# Patient Record
Sex: MALE | Race: WHITE | NOT HISPANIC OR LATINO | Employment: OTHER | URBAN - METROPOLITAN AREA
[De-identification: names, ages, dates, MRNs, and addresses within clinical notes are randomized per-mention and may not be internally consistent; named-entity substitution may affect disease eponyms.]

---

## 2023-06-02 ENCOUNTER — APPOINTMENT (OUTPATIENT)
Dept: GENERAL RADIOLOGY | Facility: CLINIC | Age: 73
DRG: 190 | End: 2023-06-02
Attending: EMERGENCY MEDICINE
Payer: COMMERCIAL

## 2023-06-02 ENCOUNTER — HOSPITAL ENCOUNTER (INPATIENT)
Facility: CLINIC | Age: 73
LOS: 5 days | Discharge: HOME | DRG: 190 | End: 2023-06-07
Attending: EMERGENCY MEDICINE | Admitting: INTERNAL MEDICINE
Payer: COMMERCIAL

## 2023-06-02 DIAGNOSIS — J44.9 CHRONIC OBSTRUCTIVE PULMONARY DISEASE, UNSPECIFIED COPD TYPE (H): Primary | ICD-10-CM

## 2023-06-02 DIAGNOSIS — J96.00 ACUTE RESPIRATORY FAILURE, UNSPECIFIED WHETHER WITH HYPOXIA OR HYPERCAPNIA (H): ICD-10-CM

## 2023-06-02 DIAGNOSIS — J44.1 COPD EXACERBATION (H): ICD-10-CM

## 2023-06-02 LAB
ALBUMIN SERPL BCG-MCNC: 4.6 G/DL (ref 3.5–5.2)
ALBUMIN UR-MCNC: NEGATIVE MG/DL
ALP SERPL-CCNC: 60 U/L (ref 40–129)
ALT SERPL W P-5'-P-CCNC: 15 U/L (ref 10–50)
ANION GAP SERPL CALCULATED.3IONS-SCNC: 13 MMOL/L (ref 7–15)
APPEARANCE UR: CLEAR
AST SERPL W P-5'-P-CCNC: 25 U/L (ref 10–50)
ATRIAL RATE - MUSE: 130 BPM
BASOPHILS # BLD AUTO: 0.1 10E3/UL (ref 0–0.2)
BASOPHILS NFR BLD AUTO: 1 %
BILIRUB SERPL-MCNC: 0.6 MG/DL
BILIRUB UR QL STRIP: NEGATIVE
BUN SERPL-MCNC: 6.6 MG/DL (ref 8–23)
CALCIUM SERPL-MCNC: 9.2 MG/DL (ref 8.8–10.2)
CHLORIDE SERPL-SCNC: 102 MMOL/L (ref 98–107)
COLOR UR AUTO: ABNORMAL
CREAT SERPL-MCNC: 0.59 MG/DL (ref 0.67–1.17)
CRP SERPL-MCNC: 12.21 MG/L
D DIMER PPP FEU-MCNC: 0.4 UG/ML FEU (ref 0–0.5)
DEPRECATED HCO3 PLAS-SCNC: 27 MMOL/L (ref 22–29)
DIASTOLIC BLOOD PRESSURE - MUSE: NORMAL MMHG
EOSINOPHIL # BLD AUTO: 0.8 10E3/UL (ref 0–0.7)
EOSINOPHIL NFR BLD AUTO: 9 %
ERYTHROCYTE [DISTWIDTH] IN BLOOD BY AUTOMATED COUNT: 13.2 % (ref 10–15)
FLUAV RNA SPEC QL NAA+PROBE: NEGATIVE
FLUBV RNA RESP QL NAA+PROBE: NEGATIVE
GFR SERPL CREATININE-BSD FRML MDRD: >90 ML/MIN/1.73M2
GLUCOSE BLDC GLUCOMTR-MCNC: 136 MG/DL (ref 70–99)
GLUCOSE BLDC GLUCOMTR-MCNC: 146 MG/DL (ref 70–99)
GLUCOSE BLDC GLUCOMTR-MCNC: 159 MG/DL (ref 70–99)
GLUCOSE SERPL-MCNC: 140 MG/DL (ref 70–99)
GLUCOSE UR STRIP-MCNC: NEGATIVE MG/DL
HCO3 BLDV-SCNC: 29 MMOL/L (ref 21–28)
HCT VFR BLD AUTO: 47.2 % (ref 40–53)
HGB BLD-MCNC: 15.5 G/DL (ref 13.3–17.7)
HGB UR QL STRIP: NEGATIVE
HOLD SPECIMEN: NORMAL
HOLD SPECIMEN: NORMAL
IMM GRANULOCYTES # BLD: 0 10E3/UL
IMM GRANULOCYTES NFR BLD: 0 %
INTERPRETATION ECG - MUSE: NORMAL
KETONES UR STRIP-MCNC: 40 MG/DL
LACTATE BLD-SCNC: 1.2 MMOL/L
LACTATE SERPL-SCNC: 1.3 MMOL/L (ref 0.7–2)
LEUKOCYTE ESTERASE UR QL STRIP: NEGATIVE
LYMPHOCYTES # BLD AUTO: 0.9 10E3/UL (ref 0.8–5.3)
LYMPHOCYTES NFR BLD AUTO: 11 %
MCH RBC QN AUTO: 28.5 PG (ref 26.5–33)
MCHC RBC AUTO-ENTMCNC: 32.8 G/DL (ref 31.5–36.5)
MCV RBC AUTO: 87 FL (ref 78–100)
MONOCYTES # BLD AUTO: 0.5 10E3/UL (ref 0–1.3)
MONOCYTES NFR BLD AUTO: 6 %
NEUTROPHILS # BLD AUTO: 6 10E3/UL (ref 1.6–8.3)
NEUTROPHILS NFR BLD AUTO: 73 %
NITRATE UR QL: NEGATIVE
NRBC # BLD AUTO: 0 10E3/UL
NRBC BLD AUTO-RTO: 0 /100
NT-PROBNP SERPL-MCNC: 241 PG/ML (ref 0–900)
P AXIS - MUSE: 75 DEGREES
PCO2 BLDV: 72 MM HG (ref 40–50)
PH BLDV: 7.21 [PH] (ref 7.32–7.43)
PH UR STRIP: 7.5 [PH] (ref 5–7)
PLATELET # BLD AUTO: 187 10E3/UL (ref 150–450)
PO2 BLDV: 73 MM HG (ref 25–47)
POTASSIUM SERPL-SCNC: 3.9 MMOL/L (ref 3.4–5.3)
PR INTERVAL - MUSE: 148 MS
PROCALCITONIN SERPL IA-MCNC: 0.08 NG/ML
PROT SERPL-MCNC: 7.5 G/DL (ref 6.4–8.3)
QRS DURATION - MUSE: 96 MS
QT - MUSE: 300 MS
QTC - MUSE: 441 MS
R AXIS - MUSE: -40 DEGREES
RBC # BLD AUTO: 5.44 10E6/UL (ref 4.4–5.9)
RSV RNA SPEC NAA+PROBE: NEGATIVE
SAO2 % BLDV: 90 % (ref 94–100)
SARS-COV-2 RNA RESP QL NAA+PROBE: NEGATIVE
SODIUM SERPL-SCNC: 142 MMOL/L (ref 136–145)
SP GR UR STRIP: 1.01 (ref 1–1.03)
SYSTOLIC BLOOD PRESSURE - MUSE: NORMAL MMHG
T AXIS - MUSE: 60 DEGREES
TROPONIN T SERPL HS-MCNC: 19 NG/L
TROPONIN T SERPL HS-MCNC: 95 NG/L
UROBILINOGEN UR STRIP-MCNC: NORMAL MG/DL
VENTRICULAR RATE- MUSE: 130 BPM
WBC # BLD AUTO: 8.3 10E3/UL (ref 4–11)

## 2023-06-02 PROCEDURE — 250N000009 HC RX 250: Performed by: INTERNAL MEDICINE

## 2023-06-02 PROCEDURE — 120N000013 HC R&B IMCU

## 2023-06-02 PROCEDURE — 999N000157 HC STATISTIC RCP TIME EA 10 MIN

## 2023-06-02 PROCEDURE — 85379 FIBRIN DEGRADATION QUANT: CPT | Performed by: EMERGENCY MEDICINE

## 2023-06-02 PROCEDURE — 82962 GLUCOSE BLOOD TEST: CPT

## 2023-06-02 PROCEDURE — 250N000009 HC RX 250

## 2023-06-02 PROCEDURE — 99223 1ST HOSP IP/OBS HIGH 75: CPT | Performed by: INTERNAL MEDICINE

## 2023-06-02 PROCEDURE — 80053 COMPREHEN METABOLIC PANEL: CPT | Performed by: EMERGENCY MEDICINE

## 2023-06-02 PROCEDURE — 81003 URINALYSIS AUTO W/O SCOPE: CPT | Performed by: INTERNAL MEDICINE

## 2023-06-02 PROCEDURE — 99291 CRITICAL CARE FIRST HOUR: CPT | Mod: 25

## 2023-06-02 PROCEDURE — 84484 ASSAY OF TROPONIN QUANT: CPT | Performed by: EMERGENCY MEDICINE

## 2023-06-02 PROCEDURE — 86037 ANCA TITER EACH ANTIBODY: CPT | Performed by: INTERNAL MEDICINE

## 2023-06-02 PROCEDURE — 84484 ASSAY OF TROPONIN QUANT: CPT | Performed by: INTERNAL MEDICINE

## 2023-06-02 PROCEDURE — 84145 PROCALCITONIN (PCT): CPT | Performed by: INTERNAL MEDICINE

## 2023-06-02 PROCEDURE — 36415 COLL VENOUS BLD VENIPUNCTURE: CPT | Performed by: INTERNAL MEDICINE

## 2023-06-02 PROCEDURE — 96365 THER/PROPH/DIAG IV INF INIT: CPT

## 2023-06-02 PROCEDURE — 85025 COMPLETE CBC W/AUTO DIFF WBC: CPT | Performed by: EMERGENCY MEDICINE

## 2023-06-02 PROCEDURE — 258N000003 HC RX IP 258 OP 636: Performed by: INTERNAL MEDICINE

## 2023-06-02 PROCEDURE — 93005 ELECTROCARDIOGRAM TRACING: CPT

## 2023-06-02 PROCEDURE — 83880 ASSAY OF NATRIURETIC PEPTIDE: CPT | Performed by: EMERGENCY MEDICINE

## 2023-06-02 PROCEDURE — 94640 AIRWAY INHALATION TREATMENT: CPT

## 2023-06-02 PROCEDURE — 86036 ANCA SCREEN EACH ANTIBODY: CPT | Performed by: INTERNAL MEDICINE

## 2023-06-02 PROCEDURE — 96375 TX/PRO/DX INJ NEW DRUG ADDON: CPT

## 2023-06-02 PROCEDURE — 5A09457 ASSISTANCE WITH RESPIRATORY VENTILATION, 24-96 CONSECUTIVE HOURS, CONTINUOUS POSITIVE AIRWAY PRESSURE: ICD-10-PCS | Performed by: INTERNAL MEDICINE

## 2023-06-02 PROCEDURE — C9803 HOPD COVID-19 SPEC COLLECT: HCPCS

## 2023-06-02 PROCEDURE — 250N000011 HC RX IP 250 OP 636: Performed by: STUDENT IN AN ORGANIZED HEALTH CARE EDUCATION/TRAINING PROGRAM

## 2023-06-02 PROCEDURE — 36415 COLL VENOUS BLD VENIPUNCTURE: CPT | Performed by: EMERGENCY MEDICINE

## 2023-06-02 PROCEDURE — 99292 CRITICAL CARE ADDL 30 MIN: CPT

## 2023-06-02 PROCEDURE — 71045 X-RAY EXAM CHEST 1 VIEW: CPT

## 2023-06-02 PROCEDURE — 250N000011 HC RX IP 250 OP 636: Performed by: EMERGENCY MEDICINE

## 2023-06-02 PROCEDURE — 250N000013 HC RX MED GY IP 250 OP 250 PS 637: Performed by: HOSPITALIST

## 2023-06-02 PROCEDURE — 83605 ASSAY OF LACTIC ACID: CPT | Performed by: INTERNAL MEDICINE

## 2023-06-02 PROCEDURE — 272N000064 HC CIRCUIT HUMIDITY W/CPAP BIPAP

## 2023-06-02 PROCEDURE — 94640 AIRWAY INHALATION TREATMENT: CPT | Mod: 76

## 2023-06-02 PROCEDURE — 250N000011 HC RX IP 250 OP 636: Performed by: INTERNAL MEDICINE

## 2023-06-02 PROCEDURE — 94660 CPAP INITIATION&MGMT: CPT

## 2023-06-02 PROCEDURE — 86140 C-REACTIVE PROTEIN: CPT | Performed by: INTERNAL MEDICINE

## 2023-06-02 PROCEDURE — 83605 ASSAY OF LACTIC ACID: CPT

## 2023-06-02 PROCEDURE — 87637 SARSCOV2&INF A&B&RSV AMP PRB: CPT | Performed by: EMERGENCY MEDICINE

## 2023-06-02 RX ORDER — METHYLPREDNISOLONE SODIUM SUCCINATE 125 MG/2ML
125 INJECTION, POWDER, LYOPHILIZED, FOR SOLUTION INTRAMUSCULAR; INTRAVENOUS ONCE
Status: COMPLETED | OUTPATIENT
Start: 2023-06-02 | End: 2023-06-02

## 2023-06-02 RX ORDER — ONDANSETRON 2 MG/ML
4 INJECTION INTRAMUSCULAR; INTRAVENOUS EVERY 6 HOURS PRN
Status: DISCONTINUED | OUTPATIENT
Start: 2023-06-02 | End: 2023-06-07 | Stop reason: HOSPADM

## 2023-06-02 RX ORDER — HYDROMORPHONE HCL IN WATER/PF 6 MG/30 ML
0.4 PATIENT CONTROLLED ANALGESIA SYRINGE INTRAVENOUS
Status: DISCONTINUED | OUTPATIENT
Start: 2023-06-02 | End: 2023-06-02

## 2023-06-02 RX ORDER — AZITHROMYCIN 500 MG/1
500 INJECTION, POWDER, LYOPHILIZED, FOR SOLUTION INTRAVENOUS EVERY 24 HOURS
Status: DISCONTINUED | OUTPATIENT
Start: 2023-06-02 | End: 2023-06-07 | Stop reason: HOSPADM

## 2023-06-02 RX ORDER — ONDANSETRON 4 MG/1
4 TABLET, ORALLY DISINTEGRATING ORAL EVERY 6 HOURS PRN
Status: DISCONTINUED | OUTPATIENT
Start: 2023-06-02 | End: 2023-06-07 | Stop reason: HOSPADM

## 2023-06-02 RX ORDER — AMOXICILLIN 250 MG
1 CAPSULE ORAL 2 TIMES DAILY PRN
Status: DISCONTINUED | OUTPATIENT
Start: 2023-06-02 | End: 2023-06-07 | Stop reason: HOSPADM

## 2023-06-02 RX ORDER — TAMSULOSIN HYDROCHLORIDE 0.4 MG/1
0.4 CAPSULE ORAL AT BEDTIME
COMMUNITY

## 2023-06-02 RX ORDER — LIDOCAINE 40 MG/G
CREAM TOPICAL
Status: DISCONTINUED | OUTPATIENT
Start: 2023-06-02 | End: 2023-06-02

## 2023-06-02 RX ORDER — PROCHLORPERAZINE 25 MG
12.5 SUPPOSITORY, RECTAL RECTAL EVERY 12 HOURS PRN
Status: DISCONTINUED | OUTPATIENT
Start: 2023-06-02 | End: 2023-06-07 | Stop reason: HOSPADM

## 2023-06-02 RX ORDER — NALOXONE HYDROCHLORIDE 0.4 MG/ML
0.2 INJECTION, SOLUTION INTRAMUSCULAR; INTRAVENOUS; SUBCUTANEOUS
Status: DISCONTINUED | OUTPATIENT
Start: 2023-06-02 | End: 2023-06-07 | Stop reason: HOSPADM

## 2023-06-02 RX ORDER — POLYETHYLENE GLYCOL 3350 17 G/17G
17 POWDER, FOR SOLUTION ORAL DAILY PRN
Status: DISCONTINUED | OUTPATIENT
Start: 2023-06-02 | End: 2023-06-07 | Stop reason: HOSPADM

## 2023-06-02 RX ORDER — AMOXICILLIN 250 MG
2 CAPSULE ORAL 2 TIMES DAILY PRN
Status: DISCONTINUED | OUTPATIENT
Start: 2023-06-02 | End: 2023-06-07 | Stop reason: HOSPADM

## 2023-06-02 RX ORDER — IPRATROPIUM BROMIDE AND ALBUTEROL SULFATE 2.5; .5 MG/3ML; MG/3ML
3 SOLUTION RESPIRATORY (INHALATION)
Status: DISCONTINUED | OUTPATIENT
Start: 2023-06-02 | End: 2023-06-06

## 2023-06-02 RX ORDER — ZOLPIDEM TARTRATE 5 MG/1
5 TABLET ORAL
Status: DISCONTINUED | OUTPATIENT
Start: 2023-06-02 | End: 2023-06-07 | Stop reason: HOSPADM

## 2023-06-02 RX ORDER — PREDNISONE 20 MG/1
40 TABLET ORAL DAILY
Status: DISCONTINUED | OUTPATIENT
Start: 2023-06-03 | End: 2023-06-05

## 2023-06-02 RX ORDER — METHYLPREDNISOLONE SODIUM SUCCINATE 125 MG/2ML
60 INJECTION, POWDER, LYOPHILIZED, FOR SOLUTION INTRAMUSCULAR; INTRAVENOUS EVERY 8 HOURS
Status: DISCONTINUED | OUTPATIENT
Start: 2023-06-02 | End: 2023-06-02

## 2023-06-02 RX ORDER — ACETAMINOPHEN 325 MG/1
650 TABLET ORAL EVERY 6 HOURS PRN
Status: DISCONTINUED | OUTPATIENT
Start: 2023-06-02 | End: 2023-06-07 | Stop reason: HOSPADM

## 2023-06-02 RX ORDER — BUDESONIDE 0.25 MG/2ML
0.25 INHALANT ORAL 2 TIMES DAILY PRN
COMMUNITY

## 2023-06-02 RX ORDER — ZOLPIDEM TARTRATE 5 MG/1
5 TABLET ORAL AT BEDTIME
COMMUNITY

## 2023-06-02 RX ORDER — LIDOCAINE 40 MG/G
CREAM TOPICAL
Status: DISCONTINUED | OUTPATIENT
Start: 2023-06-02 | End: 2023-06-07 | Stop reason: HOSPADM

## 2023-06-02 RX ORDER — IPRATROPIUM BROMIDE AND ALBUTEROL SULFATE 2.5; .5 MG/3ML; MG/3ML
SOLUTION RESPIRATORY (INHALATION)
Status: COMPLETED
Start: 2023-06-02 | End: 2023-06-02

## 2023-06-02 RX ORDER — HYDROMORPHONE HYDROCHLORIDE 1 MG/ML
0.3 INJECTION, SOLUTION INTRAMUSCULAR; INTRAVENOUS; SUBCUTANEOUS
Status: DISCONTINUED | OUTPATIENT
Start: 2023-06-02 | End: 2023-06-07 | Stop reason: HOSPADM

## 2023-06-02 RX ORDER — MAGNESIUM SULFATE HEPTAHYDRATE 40 MG/ML
2 INJECTION, SOLUTION INTRAVENOUS ONCE
Status: COMPLETED | OUTPATIENT
Start: 2023-06-02 | End: 2023-06-02

## 2023-06-02 RX ORDER — NALOXONE HYDROCHLORIDE 0.4 MG/ML
0.4 INJECTION, SOLUTION INTRAMUSCULAR; INTRAVENOUS; SUBCUTANEOUS
Status: DISCONTINUED | OUTPATIENT
Start: 2023-06-02 | End: 2023-06-07 | Stop reason: HOSPADM

## 2023-06-02 RX ORDER — HYDROMORPHONE HCL IN WATER/PF 6 MG/30 ML
0.2 PATIENT CONTROLLED ANALGESIA SYRINGE INTRAVENOUS
Status: DISCONTINUED | OUTPATIENT
Start: 2023-06-02 | End: 2023-06-02

## 2023-06-02 RX ORDER — METHYLPREDNISOLONE SODIUM SUCCINATE 125 MG/2ML
60 INJECTION, POWDER, LYOPHILIZED, FOR SOLUTION INTRAMUSCULAR; INTRAVENOUS ONCE
Status: COMPLETED | OUTPATIENT
Start: 2023-06-02 | End: 2023-06-02

## 2023-06-02 RX ORDER — ACETAMINOPHEN 650 MG/1
650 SUPPOSITORY RECTAL EVERY 6 HOURS PRN
Status: DISCONTINUED | OUTPATIENT
Start: 2023-06-02 | End: 2023-06-07 | Stop reason: HOSPADM

## 2023-06-02 RX ORDER — ALBUTEROL SULFATE 0.83 MG/ML
2.5 SOLUTION RESPIRATORY (INHALATION)
Status: DISCONTINUED | OUTPATIENT
Start: 2023-06-02 | End: 2023-06-07 | Stop reason: HOSPADM

## 2023-06-02 RX ORDER — PROCHLORPERAZINE MALEATE 5 MG
5 TABLET ORAL EVERY 6 HOURS PRN
Status: DISCONTINUED | OUTPATIENT
Start: 2023-06-02 | End: 2023-06-07 | Stop reason: HOSPADM

## 2023-06-02 RX ORDER — CITALOPRAM HYDROBROMIDE 40 MG/1
40 TABLET ORAL AT BEDTIME
COMMUNITY

## 2023-06-02 RX ORDER — BISACODYL 10 MG
10 SUPPOSITORY, RECTAL RECTAL DAILY PRN
Status: DISCONTINUED | OUTPATIENT
Start: 2023-06-02 | End: 2023-06-07 | Stop reason: HOSPADM

## 2023-06-02 RX ORDER — IPRATROPIUM BROMIDE AND ALBUTEROL SULFATE 2.5; .5 MG/3ML; MG/3ML
1 SOLUTION RESPIRATORY (INHALATION) EVERY 6 HOURS PRN
COMMUNITY

## 2023-06-02 RX ORDER — ALBUTEROL SULFATE 90 UG/1
1-2 AEROSOL, METERED RESPIRATORY (INHALATION) EVERY 6 HOURS PRN
COMMUNITY

## 2023-06-02 RX ADMIN — IPRATROPIUM BROMIDE AND ALBUTEROL SULFATE 3 ML: .5; 3 SOLUTION RESPIRATORY (INHALATION) at 19:33

## 2023-06-02 RX ADMIN — IPRATROPIUM BROMIDE AND ALBUTEROL SULFATE 3 ML: .5; 3 SOLUTION RESPIRATORY (INHALATION) at 07:08

## 2023-06-02 RX ADMIN — IPRATROPIUM BROMIDE AND ALBUTEROL SULFATE: .5; 3 SOLUTION RESPIRATORY (INHALATION) at 02:09

## 2023-06-02 RX ADMIN — ZOLPIDEM TARTRATE 5 MG: 5 TABLET ORAL at 23:40

## 2023-06-02 RX ADMIN — IPRATROPIUM BROMIDE AND ALBUTEROL SULFATE 3 ML: .5; 3 SOLUTION RESPIRATORY (INHALATION) at 15:27

## 2023-06-02 RX ADMIN — SODIUM CHLORIDE 1000 ML: 9 INJECTION, SOLUTION INTRAVENOUS at 07:02

## 2023-06-02 RX ADMIN — DEXTROSE AND SODIUM CHLORIDE: 5; 900 INJECTION, SOLUTION INTRAVENOUS at 07:05

## 2023-06-02 RX ADMIN — METHYLPREDNISOLONE SODIUM SUCCINATE 62.5 MG: 125 INJECTION, POWDER, FOR SOLUTION INTRAMUSCULAR; INTRAVENOUS at 17:44

## 2023-06-02 RX ADMIN — METHYLPREDNISOLONE SODIUM SUCCINATE 62.5 MG: 125 INJECTION, POWDER, FOR SOLUTION INTRAMUSCULAR; INTRAVENOUS at 08:20

## 2023-06-02 RX ADMIN — IPRATROPIUM BROMIDE AND ALBUTEROL SULFATE 3 ML: .5; 3 SOLUTION RESPIRATORY (INHALATION) at 11:20

## 2023-06-02 RX ADMIN — AZITHROMYCIN MONOHYDRATE 500 MG: 500 INJECTION, POWDER, LYOPHILIZED, FOR SOLUTION INTRAVENOUS at 03:38

## 2023-06-02 RX ADMIN — DEXTROSE AND SODIUM CHLORIDE: 5; 900 INJECTION, SOLUTION INTRAVENOUS at 17:47

## 2023-06-02 RX ADMIN — METHYLPREDNISOLONE SODIUM SUCCINATE 125 MG: 125 INJECTION, POWDER, FOR SOLUTION INTRAMUSCULAR; INTRAVENOUS at 01:50

## 2023-06-02 RX ADMIN — MAGNESIUM SULFATE HEPTAHYDRATE 2 G: 40 INJECTION, SOLUTION INTRAVENOUS at 01:50

## 2023-06-02 ASSESSMENT — ACTIVITIES OF DAILY LIVING (ADL)
ADLS_ACUITY_SCORE: 35
ADLS_ACUITY_SCORE: 37
ADLS_ACUITY_SCORE: 41
ADLS_ACUITY_SCORE: 37
ADLS_ACUITY_SCORE: 37
ADLS_ACUITY_SCORE: 35
ADLS_ACUITY_SCORE: 37
ADLS_ACUITY_SCORE: 37

## 2023-06-02 NOTE — ED PROVIDER NOTES
History     Chief Complaint:  Copd Exacerbation       The history is provided by the patient and the EMS personnel.      Michele Gan is a 72 year old male with a history of COPD, asthma, and GERD who presents with COPD exacerbation. EMS reports that the patient just flew into [place] from [place] and began experiencing wheezing and dyspnea. EMS reports that his dyspnea is worsened with exertion. EMS notes that the patient felt no relief with a nebulizer or BiPAP. EMS states that he doesn't regularly use CPAP or BiPAP at night, but does use oxygen. EMS reports that the patient is stiff. The patients blood sugar was 106 per EMS.     The patient reports of a cough he has had the last two days that wasn't noticeably productive and went away as of today. He denies pain or swelling in his legs. The patient has had no known contact with someone sick. He is unsure of what could have exacerbated his COPD. He has no known allergies.     Independent Historian:   None - Patient Only      Medications:    Viagra   Pulmicort   Duoneb  Zithromax   Deltasone   Flomax   Celexa   Ventolin HFA  Ambien     Past Medical History:    GERD   Kidney stone   Fatty liver   Tremor   Cataract, bilateral   Syphilis   Pulmonary nodule   COPD   Polyp colon adenomatous   Benign Prostatic Hyperplasia Hypertrophy With Obstruction  Insomnia   Asthma   Depression   COVID-19   Anxiety      Surgical history:  Laparoscopic excision of part of colon   Tonsillectomy   Colonoscopic polypectomy   Colonoscopy   Extraction cataract with insertion intraocular lens   Colon resection    Physical Exam     Patient Vitals for the past 24 hrs:   BP Temp Temp src Pulse Resp SpO2   06/02/23 0300 121/82 98.4  F (36.9  C) Temporal 115 22 95 %   06/02/23 0248 -- -- -- -- -- 97 %   06/02/23 0210 -- -- -- (!) 121 (!) 32 98 %   06/02/23 0205 -- -- -- (!) 124 (!) 37 98 %   06/02/23 0201 (!) 149/100 -- -- (!) 126 -- 98 %   06/02/23 0200 -- -- -- (!) 126 (!) 41 98 %    06/02/23 0155 (!) 149/100 98  F (36.7  C) Temporal (!) 131 (!) 41 98 %   06/02/23 0150 -- -- -- (!) 129 (!) 62 98 %   06/02/23 0145 (!) 169/112 -- -- (!) 133 (!) 54 93 %        Physical Exam  General: Patient is awake, alert and interactive. In acute distress   Head: The scalp, face, and head appear normal  Eyes: The pupils are equal, round, and reactive to light. Conjunctivae and sclerae are normal  ENT: External acoustic canals are normal. The oropharynx is normal without erythema. Uvula is in the midline  Neck: Normal range of motion. No anterior cervical lymphadenopathy noted  CV: Regular rate. S1/S2. No murmurs.   Resp: significant respiratory distress.  Inspiratory and expiratory wheezes. Prolonged expiratory phase. No rales noted. No asymmetry to breath sounds. Tachypnea   GI: Abdomen is soft, no rigidity, guarding, or rebound. No distension. No tenderness to palpation in any quadrant.     MS: Normal tone. Joints grossly normal without effusions. No asymmetric leg swelling, calf or thigh tenderness.    Skin: No rash or lesions noted. Normal capillary refill noted  Neuro:Speech is normal and fluent. Face is symmetric. Moving all extremities.   Psych:  Normal affect.  Appropriate interactions.    Emergency Department Course   ECG  ECG taken at 0146, ECG read at 0148  Sinus tachycardia   Left axis deviation   Low voltage QRS   Nonspecific ST abnormality   Abnormal ECG   Rate 130 bpm. UT interval 148 ms. QRS duration 96 ms. QT/QTc 300/441 ms. P-R-T axes 75 -40 60.     Imaging:  XR Chest Port 1 View   Final Result   IMPRESSION: EKG wires and leads projecting over the chest obscures some details.  Heart size and pulmonary vascularity within normal limits. Scattered interstitial prominence throughout the mid and lower lungs likely representing chronic lung markings.    No focal lung infiltrates. Osseous structures grossly intact. Visualized upper abdomen unremarkable.         Report per  radiology    Laboratory:  Labs Ordered and Resulted from Time of ED Arrival to Time of ED Departure   COMPREHENSIVE METABOLIC PANEL - Abnormal       Result Value    Sodium 142      Potassium 3.9      Chloride 102      Carbon Dioxide (CO2) 27      Anion Gap 13      Urea Nitrogen 6.6 (*)     Creatinine 0.59 (*)     Calcium 9.2      Glucose 140 (*)     Alkaline Phosphatase 60      AST 25      ALT 15      Protein Total 7.5      Albumin 4.6      Bilirubin Total 0.6      GFR Estimate >90     CBC WITH PLATELETS AND DIFFERENTIAL - Abnormal    WBC Count 8.3      RBC Count 5.44      Hemoglobin 15.5      Hematocrit 47.2      MCV 87      MCH 28.5      MCHC 32.8      RDW 13.2      Platelet Count 187      % Neutrophils 73      % Lymphocytes 11      % Monocytes 6      % Eosinophils 9      % Basophils 1      % Immature Granulocytes 0      NRBCs per 100 WBC 0      Absolute Neutrophils 6.0      Absolute Lymphocytes 0.9      Absolute Monocytes 0.5      Absolute Eosinophils 0.8 (*)     Absolute Basophils 0.1      Absolute Immature Granulocytes 0.0      Absolute NRBCs 0.0     ISTAT GASES LACTATE VENOUS POCT - Abnormal    Lactic Acid POCT 1.2      Bicarbonate Venous POCT 29 (*)     O2 Sat, Venous POCT 90 (*)     pCO2V Venous POCT 72 (*)     pH Venous POCT 7.21 (*)     pO2 Venous POCT 73 (*)    PROCALCITONIN - Abnormal    Procalcitonin 0.08 (*)    D DIMER QUANTITATIVE - Normal    D-Dimer Quantitative 0.40     NT PROBNP INPATIENT - Normal    N terminal Pro BNP Inpatient 241     TROPONIN T, HIGH SENSITIVITY - Normal    Troponin T, High Sensitivity 19     LACTIC ACID WHOLE BLOOD   BLOOD GAS VENOUS   INFLUENZA A/B, RSV, & SARS-COV2 PCR        Procedures   None     Emergency Department Course & Assessments:     Interventions:  Medications   No lozenges or gum should be given while patient on BIPAP/AVAPS/AVAPS AE (has no administration in time range)   Patient may continue current oral medications (has no administration in time range)    azithromycin (ZITHROMAX) 500 mg vial to attach to  mL bag (500 mg Intravenous $New Bag 6/2/23 0330)   magnesium sulfate 2 g in 50 mL sterile water intermittent infusion (0 g Intravenous Stopped 6/2/23 0352)   methylPREDNISolone sodium succinate (solu-MEDROL) injection 125 mg (125 mg Intravenous $Given 6/2/23 0150)   ipratropium - albuterol 0.5 mg/2.5 mg/3 mL (DUONEB) 0.5-2.5 (3) MG/3ML neb solution (  $Given 6/2/23 0209)        Independent Interpretation (X-rays, CTs, rhythm strip):  CXR is negative for PNA    Assessments/Consultations/Discussion of Management or Tests:  ED Course as of 06/02/23 0429 Fri Jun 02, 2023   0141 I obtained the patient's history from the patient and EMS personnel.    0147 The patient was placed on 60% FIO2 and titrated down to 40% FIO2 oxygen via BiPAP machine.    0238 I rechecked the patient and he is breathing much easier with use of the BiPAP machine.     0248 I consulted with Dr. Cardoso, hospitalist, regarding the patient.        Disposition:  The patient was admitted to the hospital under the care of Dr. Cardoso.     Impression & Plan      Medical Decision Making:  Michele Gan is a 72 year old male who presents for evaluation of shortness of breath and wheezing. Patient arrived in acute respiratory distress and was started on Bipap. Signs and symptoms are consistent with COPD exacerbation. Treated with Duonebs, solu medrol and mag. A broad differential was considered including reactive airway disease, pneumothorax, cardiac equivalent/ACS, viral induced wheezing, allergic phenomena, pneumonia, etc.  Patient feels improved after interventions here in ED but continues to have impairment in respiratory function including ongoing wheezing and increased work of breathing.  Given this, I will hospitalize for further intervention. There are no signs at this point of any other serious etiologies including those mentioned above especially acute coronary syndrome. I doubt this is ACS  given the classic story of COPD exacerbation given by the patient, the marked wheezing without rales. EKG shows Sinus tachycardia.  No signs of pneumonia. Given change in sputum production, antibiotics are indicated and first dose given in ED.  Discussed patient with hospitalist who agreed with inpatient care in the [facility].       Diagnosis:    ICD-10-CM    1. COPD exacerbation (H)  J44.1       2. Acute respiratory failure, unspecified whether with hypoxia or hypercapnia (H)  J96.00           Scribe Disclosure:  Lety CARMEN, am serving as a scribe at 2:24 AM on 6/2/2023 to document services personally performed by Kieran Oden based on my observations and the provider's statements to me.   6/2/2023   MD Akshat Caldera Christopher Joseph, MD  06/02/23 0429

## 2023-06-02 NOTE — PLAN OF CARE
A&Ox4 . VSS on BiPAP.  Denies pain and nausea.  D5 NS running @100 mL/hr. External cath placed with AUO.  Scattered petechia to BLE, R forearm scratch with minimal swelling from a dogs claw. Scattered bruising.  On BiPAP NPO otherwise regular diet as tolerated. Abdm soft, non tender. Ax1 Gb/walker. Tele: SR.

## 2023-06-02 NOTE — PROVIDER NOTIFICATION
MD Notification    Notified Person: MD    Notified Person Name: Mando Cardoso    Notification Date/Time: 6/2/23 0653    Notification Interaction: text page    Purpose of Notification:Bolus ordered and IVF in signed and held. Just want to clarify this is for the right pt and bolus still needed.     Orders Received: Give bolus.     Comments:HR over 100 and has not eaten in a few days.

## 2023-06-02 NOTE — PHARMACY-ADMISSION MEDICATION HISTORY
Pharmacist Admission Medication History    Admission medication history is complete. The information provided in this note is only as accurate as the sources available at the time of the update.    Medication reconciliation/reorder completed by provider prior to medication history? No    Information Source(s): Patient and CareEverywhere/SureScripts via in-person    Pertinent Information: None    Changes made to PTA medication list:    Added: added all medications    Deleted: None    Changed: None    Allergies reviewed with patient and updates made in EHR: Done by RN    Medication History Completed By: Namita Marx [facility] 6/2/2023 9:07 AM    Prior to Admission medications    Medication Sig Last Dose Taking? Auth Provider Long Term End Date   albuterol (PROAIR HFA/PROVENTIL HFA/VENTOLIN HFA) 108 (90 Base) MCG/ACT inhaler Inhale 1-2 puffs into the lungs every 6 hours as needed for shortness of breath, wheezing or cough  at prn Yes Unknown, Entered By History     budesonide (PULMICORT) 0.25 MG/2ML neb solution Take 0.25 mg by nebulization 2 times daily as needed  at prn Yes Unknown, Entered By History Yes    citalopram (CELEXA) 40 MG tablet Take 40 mg by mouth At Bedtime 5/31/2023 at hs Yes Unknown, Entered By History Yes    ipratropium - albuterol 0.5 mg/2.5 mg/3 mL (DUONEB) 0.5-2.5 (3) MG/3ML neb solution Take 1 vial by nebulization every 6 hours as needed for shortness of breath, wheezing or cough  at prn Yes Unknown, Entered By History     tamsulosin (FLOMAX) 0.4 MG capsule Take 0.4 mg by mouth At Bedtime 5/31/2023 at hs Yes Unknown, Entered By History     zolpidem (AMBIEN) 5 MG tablet Take 5 mg by mouth At Bedtime 5/31/2023 at hs Yes Unknown, Entered By History

## 2023-06-02 NOTE — H&P
[facility]    History and Physical - Hospitalist Service       Date of Admission:  6/2/2023    Assessment & Plan      Michele Gan is a 72 year old male admitted on 6/2/2023. He presents to the emergency department for evaluation of 2 days shortness of breath in the setting of underlying severe COPD.  Admitted for likely COPD exacerbation    Acute COPD exacerbation:  Acute on chronic hypoxic hypercarbic respiratory failure:  Severe chronic obstructive lung disease: Bronchitic phenotype COPD with FEV1 of 38%, residual volume of 175%, DLCO 62%.  Severe emphysematous changes by imaging with scattered bronchial thickening.  Follows with pulmonary through [facility].  Negative COVID, RSV, and influenza  -Continue BiPAP, wean as tolerated.  Monitoring in stepdown ICU while on BiPAP  -Continued scheduled DuoNebs 4 times daily, as needed albuterol nebulizers available  -Continue prior to admission inhaled steroid/LABA when reconciled by pharmacy.  Patient is uncertain of the name of this inhaler  -IV azithromycin 500 mg daily for course of treatment  -Sputum culture and Gram stain can be collected if patient with any expectorant  -Add on procalcitonin; 5/2/2023 CT with some tree-in-bud opacities in bilateral lower lobes which were thought to be inflammatory  -patient reports that he purchased a home oxygen condenser and has been using around 3 L at night; discussed risk of hypercarbia and CO2 narcosis with overcorrection of oxygenation in the setting of underlying chronic lung disease and encouraged him to inform his pulmonary provider regarding his home O2 use at his upcoming visit as well.  For now, oximetry, oxygen as needed  -Received Solu-Medrol 125 mg in the emergency department.  Continuing 62.5 mg every 8 hours for now with plan to transition to oral prednisone when weaned off of BiPAP  -continued tobacco cessation; quit in 2004    Pulmonary nodules: As of May 2 2023 CT through [facility]  system, was noted to have a growing right upper lobe nodule measuring 6 mm as well as a new 4 mm lingula nodular finding.  In addition, severe emphysema, scattered bronchial wall thickening, and numerous new scattered sub-4 mm tree-in-bud opacities in bilateral lower and middle lobes.  -HIV negative in April 2022, though syphilis antibodies positive; RPR negative.  Followed by ID service at [facility] and treated with PCN by chart review.  -Patient is aware of plan for follow-up CT imaging in 3 months  -Patient reports he is due for follow-up with his pulmonary provider.  Had plans to schedule this as he returned to [place] from vacation    Petechiae, purpura: Patient with several nontender, nonblanching purpuric lesions involving his forearms bilaterally.  New findings for him over the past weeks and associated with multiple episodes of minor trauma.  On aspirin, though no blood thinners.  Normal platelet count, normal LFTs.  Here primarily for what appears to be a COPD exacerbation, though also with history of pulmonary infiltrates early May, elevated eosinophils.  Consider [place], as this could result in patient's peripheral eosinophilia (though only mild), purpura, and pulmonary findings including contributions to his obstructive lung disease and impaired DLCO as well as pulmonary findings with nonnecrotizing pulmonary nodules noted on May CT.  Mild eosinophilia and the fact that patient's purpura were exacerbated by small injuries are reassuring that this is not a primary vasculitis.  -UA to evaluate for active sediment  -ANCA  -Procalcitonin obtained and low suggesting against bacterial infection  -Add on CRP  -Currently on steroids as above which will likely result in resolution of peripheral eosinophilia.  -If any hemoptysis, would consult pulmonary for consideration of BAL  -Repeat troponin, initial within normal limits  -EKG ordered and pending       Diet:  N.p.o. while on BiPAP as tolerated to regular  diet when able to wean from BiPAP  DVT Prophylaxis: Pneumatic Compression Devices  Albarran Catheter: Not present  Lines: None     Cardiac Monitoring: None  Code Status:   full code.  Confirmed with patient on admission    Clinically Significant Risk Factors Present on Admission                    # Non-Invasive mechanical ventilation: current O2 Device: BiPAP/CPAP  # Acute hypoxic respiratory failure: continue supplemental O2 as needed              Disposition Plan      Expected Discharge Date: 06/04/2023                  Mando Cardoso MD  Hospitalist Service  [facility]  Securely message with Leev[employer] (more info)  Text page via [employer] Paging/Directory     ______________________________________________________________________    Chief Complaint   Shortness of breath x2 days    History is obtained from the patient, chart review, discussion with Dr. Enrique in the emergency department    History of Present Illness   Michele Gan is a 72 year old male who presents to the emergency department with a 2-day history of increased shortness of breath.    Patient has a history of known severe COPD with DLCO of 62%, FEV1 of 38%.  Follows with pulmonary through [facility].  As of May 2, 2023 patient did have a chest CT with scattered lung nodules as well as bilateral bibasilar nodular and groundglass opacities.  He is due for follow-up CT in August.    Patient was recently in [place] on vacation.  He stayed for 5 days and a fancy camper.  Tells me that there was multiple cats around the campsite, and though he had no prior history of allergies to cats, wondered if he might be allergic as 2 days ago he developed acute shortness of breath.  This was similar to his prior COPD exacerbations, though believes it was more severe.  He noticed that he was breathing heavily, felt as though he might pass out.  When he arrived back in [place] today, presented the emergency department for evaluation and was  found to have hypoxic hypercarbic respiratory failure and placed on BiPAP.  He is feeling better on BiPAP.  He has a chronic cough with no significant sputum production.  Does not believe his cough has changed.  No fevers, no chills.  He has not noted any change in urination, no blood in his urine.    He does have some chest pressure.  Initial troponin within normal limits.  No prior history of heart disease.  Tells me that he completed a cardiac stress test many years ago which was negative.    Patient takes a daily aspirin.  No other blood thinners.  He has some petechiae on his lower extremities which he noticed the other day, he is uncertain of the duration of their presence.  He also has some purpura on his forearms bilaterally which he believes are associated with very minor trauma.  Had had not had issues with this before.  He does have a scratch on his right forearm from a dog; this injury occurred yesterday, after other purpura had been noted.    He is not able to identify any clear trigger for onset of his shortness of breath.  He has not felt clammy, no shoulder pain, no fevers, no chills.    For the past 2 days, he has not eaten well because he has been short of breath.  Feels dehydrated.  Tachycardic in the 120 range and will be receiving an IV saline bolus with reassessment of heart rate      Past Medical History    COPD  Tremor  Latent syphillis s/p treatment  Erectile dysfunction  insomnia    Past Surgical History   Partial colectomy for diverticulitis 2008  Tonsillectomy  Cataract extraction    Prior to Admission Medications   Aspirin 81 mg daily, DuoNebs 4 times daily, albuterol rescue inhaler, reports that he is on a controller inhaler, but cannot recall the name of this.  Does not believe he is on daily azithromycin, but appears to have been on azithromycin 250 mg daily in the past per May 2022 pulmonology note.  Takes Tylenol every morning for some low back pain             Physical Exam   Vital  Signs: Temp: 98  F (36.7  C) Temp src: Temporal BP: (!) 149/100 Pulse: (!) 121   Resp: (!) 32 SpO2: 97 % O2 Device: BiPAP/CPAP    Weight: 0 lbs 0 oz    General Appearance: Currently on BiPAP, but generally well-appearing 72-year-old male lying in bed.  Eyes: No scleral icterus, mild scleral injection bilaterally  HEENT: Mild rhinophyma.  Atraumatic  Respiratory: Prolonged expiratory phase even on BiPAP with wheezing throughout expiratory phase.  Good air movement, however.  Tachypnea into the mid 20s range, no rhonchi  Cardiovascular: Tachycardia to the 110 range, distant heart sounds with no appreciable murmur  GI: Abdomen soft, nontender palpation.  No palpable mass.  Lymph/Hematologic: No lower extremity edema  Skin: Patient with a few petechiae of his lower extremities bilaterally.  His forearms have areas of purpura which are nontender and nonpalpable.  He believes these were related to small injuries over the past week.  Right forearm has a scratch from a dog's claw with some surrounding swelling  Musculoskeletal: Muscular tone and bulk intact in all extremities and appropriate for age  Neurologic: Alert, conversant, appropriate in conversation.  Mental status grossly intact.  Psychiatric: Extremely pleasant, normal affect    Medical Decision Making       80 MINUTES SPENT BY ME on the date of service doing chart review, history, exam, documentation & further activities per the note.      Data     I have personally reviewed the following data over the past 24 hrs:    8.3  \   15.5   / 187     142 102 6.6 (L) /  136 (H)   3.9 27 0.59 (L) \       ALT: 15 AST: 25 AP: 60 TBILI: 0.6   ALB: 4.6 TOT PROTEIN: 7.5 LIPASE: N/A       Trop: 19 BNP: 241       Procal: 0.08 (H) CRP: N/A Lactic Acid: 1.3       INR:  N/A PTT:  N/A   D-dimer:  0.40 Fibrinogen:  N/A       Imaging results reviewed over the past 24 hrs:   Recent Results (from the past 24 hour(s))   XR Chest Port 1 View    Narrative    EXAM: XR CHEST PORT 1  VIEW  LOCATION: [facility]  DATE/TIME: 6/2/2023 1:53 AM CDT    INDICATION: SOB  COMPARISON: None.      Impression    IMPRESSION: EKG wires and leads projecting over the chest obscures some details.  Heart size and pulmonary vascularity within normal limits. Scattered interstitial prominence throughout the mid and lower lungs likely representing chronic lung markings.   No focal lung infiltrates. Osseous structures grossly intact. Visualized upper abdomen unremarkable.

## 2023-06-02 NOTE — PLAN OF CARE
Goal Outcome Evaluation:         Up to floor at 0600. Alert and oriented x4. Vital signs stable on Bipap 40%. Assist of 1. Pivot transfer from bed to bed utilized. Tolerating NPO diet. Lung sounds expiratory wheezing. SALMERNO. Abdominal muscle use after settling into bed. - flatus, BM -. Due to void, external cath in place. Bruising bilateral forearms. Scab to R forearm. Petechiae to BLE. Denies itchiness or irritation. Denies pain and nausea. Tele SR.

## 2023-06-02 NOTE — PROGRESS NOTES
[facility]    Medicine Progress Note - Hospitalist Service    Date of Admission:  6/2/2023  Date of Service: 06/02/2023    Assessment & Plan      Michele Gan is a 72 year old male admitted on 6/2/2023. He presents to the emergency department for evaluation of 2 days shortness of breath in the setting of underlying severe COPD.  Admitted for likely COPD exacerbation    Acute COPD exacerbation:  Acute on chronic hypoxic hypercarbic respiratory failure:  Severe chronic obstructive lung disease:   Assessment: patient reports that he purchased a home oxygen condenser and has been using around 3 L at night; discussed risk of hypercarbia and CO2 narcosis with overcorrection of oxygenation in the setting of underlying chronic lung disease and encouraged him to inform his pulmonary provider regarding his home O2 use at his upcoming visit as well. Bronchitic phenotype COPD with FEV1 of 38%, residual volume of 175%, DLCO 62%.  Severe emphysematous changes by imaging with scattered bronchial thickening.  Follows with pulmonary through [facility].  Negative COVID, RSV, and influenza. procalcitonin 0.08  Plan:  -Continue BiPAP, wean as able  -Continued scheduled DuoNebs 4 times daily, as needed albuterol nebulizers available  -Continue prior to admission inhaled steroid/LABA  -IV azithromycin 500 mg daily for course of treatment=   , oximetry, oxygen as needed  -Received Solu-Medrol 125 mg in the ED -> Continuing 62.5 mg every 12 for now with plan to transition to oral prednisone tomorrow AM  -continued tobacco cessation; quit in 2004    Pulmonary nodules: As of May 2 2023 CT through [facility] system, was noted to have a growing right upper lobe nodule measuring 6 mm as well as a new 4 mm lingula nodular finding.  In addition, severe emphysema, scattered bronchial wall thickening, and numerous new scattered sub-4 mm tree-in-bud opacities in bilateral lower and middle lobes.  -HIV negative in April  2022, though syphilis antibodies positive; RPR negative.  Followed by ID service at [place] and treated with PCN by chart review.  -Patient is aware of plan for follow-up CT imaging in 3 months  -Patient reports he is due for follow-up with his pulmonary provider.  Had plans to schedule this as he returned to [place] from vacation    Petechiae, purpura: Patient with several nontender, nonblanching purpuric lesions involving his forearms bilaterally.  New findings for him over the past weeks and associated with multiple episodes of minor trauma.  On aspirin, though no blood thinners.  Normal platelet count, normal LFTs.  Here primarily for what appears to be a COPD exacerbation, though also with history of pulmonary infiltrates early May, elevated eosinophils.  Consider Churg-Titi, as this could result in patient's peripheral eosinophilia (though only mild), purpura, and pulmonary findings including contributions to his obstructive lung disease and impaired DLCO as well as pulmonary findings with nonnecrotizing pulmonary nodules noted on May CT.  Mild eosinophilia and the fact that patient's purpura were exacerbated by small injuries are reassuring that this is not a primary vasculitis.  Plan:  -UA pending   -ANCA pending   -Currently on steroids as above which will likely result in resolution of peripheral eosinophilia.  -If any hemoptysis, would consult pulmonary for consideration of BAL       Diet: Advance Diet as Tolerated: Regular Diet Adult    DVT Prophylaxis: Pneumatic Compression Devices  Albarran Catheter: Not present  Lines: None     Cardiac Monitoring: None  Code Status: Full Code      Clinically Significant Risk Factors Present on Admission                                Disposition Plan      Expected Discharge Date: 06/05/2023,  3:00 PM                Anthony Raymundo MD  Hospitalist Service  [facility]  Securely message with [employer] (more info)  Text page via [email or web address] Paging/Directory    ______________________________________________________________________    Interval History      Remains on BiPAP  No fevers  No CP  No nausea / vomiting  No acute events since admission this AM    Physical Exam   Vital Signs: Temp: 97.9  F (36.6  C) Temp src: Oral BP: (!) 121/91 Pulse: 107   Resp: 29 SpO2: 97 % O2 Device: Nasal cannula Oxygen Delivery: 3 LPM  Weight: 0 lbs 0 oz    ----------------------------------------------------------------------------------------    Medical Decision Making       25 MINUTES SPENT BY ME on the date of service doing chart review, history, exam, documentation & further activities per the note.      Data     I have personally reviewed the following data over the past 24 hrs:    8.3  \   15.5   / 187     142 102 6.6 (L) /  136 (H)   3.9 27 0.59 (L) \       ALT: 15 AST: 25 AP: 60 TBILI: 0.6   ALB: 4.6 TOT PROTEIN: 7.5 LIPASE: N/A       Trop: 95 (H) BNP: 241       Procal: 0.08 (H) CRP: 12.21 (H) Lactic Acid: 1.3       INR:  N/A PTT:  N/A   D-dimer:  0.40 Fibrinogen:  N/A       Imaging results reviewed over the past 24 hrs:   Recent Results (from the past 24 hour(s))   XR Chest Port 1 View    Narrative    EXAM: XR CHEST PORT 1 VIEW  LOCATION: [facility]  DATE/TIME: 6/2/2023 1:53 AM CDT    INDICATION: SOB  COMPARISON: None.      Impression    IMPRESSION: EKG wires and leads projecting over the chest obscures some details.  Heart size and pulmonary vascularity within normal limits. Scattered interstitial prominence throughout the mid and lower lungs likely representing chronic lung markings.   No focal lung infiltrates. Osseous structures grossly intact. Visualized upper abdomen unremarkable.     Most Recent 3 CBC's:  Recent Labs   Lab Test 06/02/23  0144   WBC 8.3   HGB 15.5   MCV 87        Most Recent 3 BMP's:  Recent Labs   Lab Test 06/02/23  0611 06/02/23  0527 06/02/23  0144   NA  --   --  142   POTASSIUM  --   --  3.9   CHLORIDE  --   --  102   CO2   --   --  27   BUN  --   --  6.6*   CR  --   --  0.59*   ANIONGAP  --   --  13   TOBIAS  --   --  9.2   * 146* 140*     Most Recent 2 LFT's:  Recent Labs   Lab Test 06/02/23 0144   AST 25   ALT 15   ALKPHOS 60   BILITOTAL 0.6     Most Recent 3 INR's:No lab results found.  Most Recent 3 Troponin's:No lab results found.  Most Recent 3 BNP's:  Recent Labs   Lab Test 06/02/23 0144   NTBNPI 241

## 2023-06-02 NOTE — ED NOTES
Bed: ST  Expected date: 6/2/23  Expected time: 1:40 AM  Means of arrival: Ambulance  Comments:  Alvin [address] 72M COPD exac.

## 2023-06-03 PROCEDURE — 999N000157 HC STATISTIC RCP TIME EA 10 MIN

## 2023-06-03 PROCEDURE — 250N000013 HC RX MED GY IP 250 OP 250 PS 637: Performed by: HOSPITALIST

## 2023-06-03 PROCEDURE — 250N000009 HC RX 250: Performed by: INTERNAL MEDICINE

## 2023-06-03 PROCEDURE — 120N000013 HC R&B IMCU

## 2023-06-03 PROCEDURE — 250N000011 HC RX IP 250 OP 636: Performed by: INTERNAL MEDICINE

## 2023-06-03 PROCEDURE — 94660 CPAP INITIATION&MGMT: CPT

## 2023-06-03 PROCEDURE — 99233 SBSQ HOSP IP/OBS HIGH 50: CPT | Performed by: HOSPITALIST

## 2023-06-03 PROCEDURE — 258N000003 HC RX IP 258 OP 636: Performed by: INTERNAL MEDICINE

## 2023-06-03 PROCEDURE — 250N000011 HC RX IP 250 OP 636: Performed by: HOSPITALIST

## 2023-06-03 PROCEDURE — 94640 AIRWAY INHALATION TREATMENT: CPT | Mod: 76

## 2023-06-03 PROCEDURE — 94640 AIRWAY INHALATION TREATMENT: CPT

## 2023-06-03 RX ORDER — METHYLPREDNISOLONE SODIUM SUCCINATE 125 MG/2ML
60 INJECTION, POWDER, LYOPHILIZED, FOR SOLUTION INTRAMUSCULAR; INTRAVENOUS EVERY 12 HOURS
Status: DISCONTINUED | OUTPATIENT
Start: 2023-06-03 | End: 2023-06-04

## 2023-06-03 RX ADMIN — ZOLPIDEM TARTRATE 5 MG: 5 TABLET ORAL at 21:58

## 2023-06-03 RX ADMIN — METHYLPREDNISOLONE SODIUM SUCCINATE 62.5 MG: 125 INJECTION, POWDER, FOR SOLUTION INTRAMUSCULAR; INTRAVENOUS at 20:17

## 2023-06-03 RX ADMIN — IPRATROPIUM BROMIDE AND ALBUTEROL SULFATE 3 ML: .5; 3 SOLUTION RESPIRATORY (INHALATION) at 11:41

## 2023-06-03 RX ADMIN — IPRATROPIUM BROMIDE AND ALBUTEROL SULFATE 3 ML: .5; 3 SOLUTION RESPIRATORY (INHALATION) at 15:18

## 2023-06-03 RX ADMIN — DEXTROSE AND SODIUM CHLORIDE: 5; 900 INJECTION, SOLUTION INTRAVENOUS at 03:28

## 2023-06-03 RX ADMIN — IPRATROPIUM BROMIDE AND ALBUTEROL SULFATE 3 ML: .5; 3 SOLUTION RESPIRATORY (INHALATION) at 19:31

## 2023-06-03 RX ADMIN — METHYLPREDNISOLONE SODIUM SUCCINATE 62.5 MG: 125 INJECTION, POWDER, FOR SOLUTION INTRAMUSCULAR; INTRAVENOUS at 08:38

## 2023-06-03 RX ADMIN — IPRATROPIUM BROMIDE AND ALBUTEROL SULFATE 3 ML: .5; 3 SOLUTION RESPIRATORY (INHALATION) at 06:52

## 2023-06-03 RX ADMIN — AZITHROMYCIN MONOHYDRATE 500 MG: 500 INJECTION, POWDER, LYOPHILIZED, FOR SOLUTION INTRAVENOUS at 05:47

## 2023-06-03 ASSESSMENT — ACTIVITIES OF DAILY LIVING (ADL)
DRESSING/BATHING_DIFFICULTY: NO
DOING_ERRANDS_INDEPENDENTLY_DIFFICULTY: NO
ADLS_ACUITY_SCORE: 41
WEAR_GLASSES_OR_BLIND: NO
ADLS_ACUITY_SCORE: 41
ADLS_ACUITY_SCORE: 26
ADLS_ACUITY_SCORE: 41
DIFFICULTY_EATING/SWALLOWING: NO
TRANSFERRING: 0-->ASSISTANCE NEEDED (DEVELOPMENTALLY APPROPRIATE)
ADLS_ACUITY_SCORE: 41
CHANGE_IN_FUNCTIONAL_STATUS_SINCE_ONSET_OF_CURRENT_ILLNESS/INJURY: YES
CONCENTRATING,_REMEMBERING_OR_MAKING_DECISIONS_DIFFICULTY: NO
ADLS_ACUITY_SCORE: 41
TOILETING_ISSUES: NO
FALL_HISTORY_WITHIN_LAST_SIX_MONTHS: NO
WALKING_OR_CLIMBING_STAIRS_DIFFICULTY: YES
ADLS_ACUITY_SCORE: 26
ADLS_ACUITY_SCORE: 26
WALKING_OR_CLIMBING_STAIRS: AMBULATION DIFFICULTY, ASSISTANCE 1 PERSON
ADLS_ACUITY_SCORE: 41
TRANSFERRING: 1-->ASSISTANCE (EQUIPMENT/PERSON) NEEDED

## 2023-06-03 NOTE — PROGRESS NOTES
[facility]    Medicine Progress Note - Hospitalist Service    Date of Admission:  6/2/2023    Assessment & Plan   Michele Gan is a 72 year old male admitted on 6/2/2023. He presents to the emergency department for evaluation of 2 days shortness of breath in the setting of underlying severe COPD.  Admitted for likely COPD exacerbation    Acute COPD exacerbation:  Acute on chronic hypoxic hypercarbic respiratory failure:  Severe chronic obstructive lung disease:   Pt reports that he purchased a home oxygen concentrator and has been using around 3 L at night;  risk of hypercarbia and CO2 narcosis with overcorrection of oxygenation in the setting of underlying chronic lung disease was d/w him on admission and encouraged him to inform his pulmonary provider regarding his home O2 use at his upcoming visit as well. Bronchitic phenotype COPD with FEV1 of 38%, residual volume of 175%, DLCO 62%.  Severe emphysematous changes by imaging with scattered bronchial thickening.  Follows with pulmonary through [facility].  Negative COVID, RSV, and influenza. procalcitonin 0.08  -Continue BiPAP, wean as able  -Continued scheduled DuoNebs 4 times daily, as needed albuterol nebulizers available  -Continue prior to admission inhaled steroid/LABA  -IV azithromycin 500 mg daily for course of treatment, oximetry, oxygen as needed  -Received Solu-Medrol 125 mg in the ED -> Continuing 62.5 mg every 12 for now with plan to transition to oral prednisone once able to wean off BIPAP  -continued tobacco cessation; quit in 2004    Pulmonary nodules:   As of May 2 2023 CT through [facility] system, was noted to have a growing right upper lobe nodule measuring 6 mm as well as a new 4 mm lingula nodular finding.  In addition, severe emphysema, scattered bronchial wall thickening, and numerous new scattered sub-4 mm tree-in-bud opacities in bilateral lower and middle lobes.  -HIV negative in April 2022, though syphilis  antibodies positive; RPR negative.  Followed by ID service at [place] and treated with PCN by chart review.  -Patient is aware of plan for follow-up CT imaging in 3 months  -Patient reports he is due for follow-up with his pulmonary provider.  Had plans to schedule this as he returned to [place] from vacation    Petechiae, purpura:   Patient with several nontender, nonblanching purpuric lesions involving his forearms bilaterally.  New findings for him over the past weeks and associated with multiple episodes of minor trauma.  On aspirin, though no blood thinners.  Normal platelet count, normal LFTs.  Here primarily for what appears to be a COPD exacerbation, though also with history of pulmonary infiltrates early May, elevated eosinophils.  Consider Churg-Titi, as this could result in patient's peripheral eosinophilia (though only mild), purpura, and pulmonary findings including contributions to his obstructive lung disease and impaired DLCO as well as pulmonary findings with nonnecrotizing pulmonary nodules noted on May CT.  Mild eosinophilia and the fact that patient's purpura were exacerbated by small injuries are reassuring that this is not a primary vasculitis.  Plan:  -UA negative for blood or infection  -ANCA pending   -Currently on steroids as above which will likely result in resolution of peripheral eosinophilia.  -If any hemoptysis, would consult pulmonary for consideration of BAL         Diet: Advance Diet as Tolerated: Regular Diet Adult    DVT Prophylaxis: Pneumatic Compression Devices  Albarran Catheter: Not present  Lines: None     Cardiac Monitoring: None  Code Status: Full Code        Disposition Plan     Expected Discharge Date: 06/05/2023,  3:00 PM                Ileana Parker MD  Hospitalist Service  [facility]  Text page via [email or web address] Paging/Directory   ______________________________________________________________________    Interval History   Pt feeling a little  better today. Did have episode of SOB when standing for his weight this morning and needed BIPAP. Cough is now productive of some sputum. Overall feeling better than the last few days. Minimal SOB while resting/lying down.    Physical Exam   Vital Signs: Temp: 97.7  F (36.5  C) Temp src: Axillary BP: 120/83 Pulse: 64   Resp: 18 SpO2: 95 % O2 Device: BiPAP/CPAP Oxygen Delivery: 3 LPM  Weight: 160 lbs 9.6 oz    Constitutional: WDWN male lying in bed in NAD  HEENT: NC/AT, no scleral icterus, nl conjunctiva, BIPAP mask in place  Respiratory: good inspiratory effort, lungs with good air movement, bilateral inspiratory/exp wheezes  Cardiovascular: RRR, no M/R/G, 2+ radial pulses  GI: soft, nondistended  Skin: no rashes, warm, dry  Neuro: CN 2-12 grossly intact  MSK: nl strength, able to move all 4 exts  Psych: nl mood/affect/judgment.      Medical Decision Making       50 MINUTES SPENT BY ME on the date of service doing chart review, history, exam, documentation & further activities per the note.      Data         Imaging results reviewed over the past 24 hrs:   No results found for this or any previous visit (from the past 24 hour(s)).

## 2023-06-03 NOTE — PLAN OF CARE
A&Ox4 . VSS on 3-4L NC.  Denies pain and nausea. External cath placed with AUO.  Scattered petechia to BLE, R forearm scratch with minimal swelling from a dogs claw. Scattered bruising. Tolerating regular diet but w/ poor appetite. Encouraging IS every hr.  Abdm soft, non tender. Ax1 Gb/walker. Tele: SR.

## 2023-06-03 NOTE — PLAN OF CARE
AxOx4. Tele = SR/ST.    VSS on BIPAP 30% / 3LNC. Currently maintaining bedrest; otherwise assist x1 pivot. Regular diet when off BIPAP. External catheter in place with adequate output. No BM this shift.    PIV x2, 1 SL + 1 infusing D5NS @ 100. Scheduled IV abx + medications.    Scattered bruises, petechia on LE + scratch on R arm.    Denies pain / nausea.    Became very dyspneic when standing for weight this AM.  Remained on BIPAP while standing up but did not help with dyspnea.  Slow measured breaths encouraged.    Continue plan of care.

## 2023-06-03 NOTE — PROVIDER NOTIFICATION
"Paged Mohsin MD:    \"Pt would like to take his PTA Ambien 5mg tonight for sleep.  Could we get this ordered? Thanks!\"    PTA med ordered.  "

## 2023-06-04 LAB
ANION GAP SERPL CALCULATED.3IONS-SCNC: 9 MMOL/L (ref 7–15)
BASOPHILS # BLD AUTO: 0 10E3/UL (ref 0–0.2)
BASOPHILS NFR BLD AUTO: 0 %
BUN SERPL-MCNC: 11.8 MG/DL (ref 8–23)
CALCIUM SERPL-MCNC: 8.6 MG/DL (ref 8.8–10.2)
CHLORIDE SERPL-SCNC: 105 MMOL/L (ref 98–107)
CREAT SERPL-MCNC: 0.6 MG/DL (ref 0.67–1.17)
DEPRECATED HCO3 PLAS-SCNC: 26 MMOL/L (ref 22–29)
EOSINOPHIL # BLD AUTO: 0 10E3/UL (ref 0–0.7)
EOSINOPHIL NFR BLD AUTO: 0 %
ERYTHROCYTE [DISTWIDTH] IN BLOOD BY AUTOMATED COUNT: 13 % (ref 10–15)
GFR SERPL CREATININE-BSD FRML MDRD: >90 ML/MIN/1.73M2
GLUCOSE SERPL-MCNC: 131 MG/DL (ref 70–99)
HCT VFR BLD AUTO: 45.4 % (ref 40–53)
HGB BLD-MCNC: 14.8 G/DL (ref 13.3–17.7)
IMM GRANULOCYTES # BLD: 0.1 10E3/UL
IMM GRANULOCYTES NFR BLD: 0 %
LYMPHOCYTES # BLD AUTO: 0.6 10E3/UL (ref 0.8–5.3)
LYMPHOCYTES NFR BLD AUTO: 5 %
MCH RBC QN AUTO: 28.7 PG (ref 26.5–33)
MCHC RBC AUTO-ENTMCNC: 32.6 G/DL (ref 31.5–36.5)
MCV RBC AUTO: 88 FL (ref 78–100)
MONOCYTES # BLD AUTO: 0.2 10E3/UL (ref 0–1.3)
MONOCYTES NFR BLD AUTO: 2 %
NEUTROPHILS # BLD AUTO: 11.1 10E3/UL (ref 1.6–8.3)
NEUTROPHILS NFR BLD AUTO: 93 %
NRBC # BLD AUTO: 0 10E3/UL
NRBC BLD AUTO-RTO: 0 /100
PLATELET # BLD AUTO: 204 10E3/UL (ref 150–450)
POTASSIUM SERPL-SCNC: 4.3 MMOL/L (ref 3.4–5.3)
RBC # BLD AUTO: 5.16 10E6/UL (ref 4.4–5.9)
SODIUM SERPL-SCNC: 140 MMOL/L (ref 136–145)
WBC # BLD AUTO: 12 10E3/UL (ref 4–11)

## 2023-06-04 PROCEDURE — 80048 BASIC METABOLIC PNL TOTAL CA: CPT | Performed by: HOSPITALIST

## 2023-06-04 PROCEDURE — 250N000012 HC RX MED GY IP 250 OP 636 PS 637: Performed by: STUDENT IN AN ORGANIZED HEALTH CARE EDUCATION/TRAINING PROGRAM

## 2023-06-04 PROCEDURE — 36415 COLL VENOUS BLD VENIPUNCTURE: CPT | Performed by: HOSPITALIST

## 2023-06-04 PROCEDURE — 250N000011 HC RX IP 250 OP 636: Performed by: INTERNAL MEDICINE

## 2023-06-04 PROCEDURE — 250N000009 HC RX 250: Performed by: INTERNAL MEDICINE

## 2023-06-04 PROCEDURE — 999N000157 HC STATISTIC RCP TIME EA 10 MIN

## 2023-06-04 PROCEDURE — 94640 AIRWAY INHALATION TREATMENT: CPT | Mod: 76

## 2023-06-04 PROCEDURE — 120N000001 HC R&B MED SURG/OB

## 2023-06-04 PROCEDURE — 99233 SBSQ HOSP IP/OBS HIGH 50: CPT | Performed by: HOSPITALIST

## 2023-06-04 PROCEDURE — 250N000013 HC RX MED GY IP 250 OP 250 PS 637: Performed by: HOSPITALIST

## 2023-06-04 PROCEDURE — 85004 AUTOMATED DIFF WBC COUNT: CPT | Performed by: HOSPITALIST

## 2023-06-04 PROCEDURE — 94640 AIRWAY INHALATION TREATMENT: CPT

## 2023-06-04 RX ADMIN — IPRATROPIUM BROMIDE AND ALBUTEROL SULFATE 3 ML: .5; 3 SOLUTION RESPIRATORY (INHALATION) at 07:53

## 2023-06-04 RX ADMIN — PREDNISONE 40 MG: 20 TABLET ORAL at 08:42

## 2023-06-04 RX ADMIN — IPRATROPIUM BROMIDE AND ALBUTEROL SULFATE 3 ML: .5; 3 SOLUTION RESPIRATORY (INHALATION) at 11:44

## 2023-06-04 RX ADMIN — IPRATROPIUM BROMIDE AND ALBUTEROL SULFATE 3 ML: .5; 3 SOLUTION RESPIRATORY (INHALATION) at 19:31

## 2023-06-04 RX ADMIN — AZITHROMYCIN MONOHYDRATE 500 MG: 500 INJECTION, POWDER, LYOPHILIZED, FOR SOLUTION INTRAVENOUS at 05:44

## 2023-06-04 RX ADMIN — ZOLPIDEM TARTRATE 5 MG: 5 TABLET ORAL at 21:41

## 2023-06-04 RX ADMIN — IPRATROPIUM BROMIDE AND ALBUTEROL SULFATE 3 ML: .5; 3 SOLUTION RESPIRATORY (INHALATION) at 14:48

## 2023-06-04 ASSESSMENT — ACTIVITIES OF DAILY LIVING (ADL)
ADLS_ACUITY_SCORE: 26

## 2023-06-04 NOTE — PLAN OF CARE
AxOx4. Tele = SR/ST.    VSS on 2LNC, dyspnea on exertion. Assist x1 GB/W. Regular diet when off BIPAP. External catheter in place with adequate output. No BM this shift.    PIV x2 SL. Scheduled IV abx + medications.    Scattered bruises, petechia on LE + scratch on R arm.    Denies pain / nausea.    Continue plan of care.

## 2023-06-04 NOTE — PLAN OF CARE
Summary: COPD exacerbation   DATE & TIME: 6246-9896    Cognitive Concerns/ Orientation: A&Ox4   BEHAVIOR & AGGRESSION TOOL COLOR: Green  CIWA SCORE: N/A   ABNL VS/O2:  Hypertensive, tachy at times. Dyspnea on exertion. On 2L NC.  MOBILITY: Ax1 GB/W  PAIN MANAGMENT: Denies pain  DIET: Regular-poor appetite. Denies N/V.   BOWEL/BLADDER: Uses bedside commode and urinal.   ABNL LAB/BG: Pending ANCA IgG  DRAIN/DEVICES: 2x PIV S/L; intermittent IV abx  TELEMETRY RHYTHM: N/A  SKIN: Scattered bruises, petechia on LE + scratch on R arm.  TESTS/PROCEDURES: N/A this shift  Discharge pending progress  OTHER IMPORTANT INFO: RT following

## 2023-06-04 NOTE — PROGRESS NOTES
A&Ox4 . VSS on 2L NC.  Denies pain and nausea. Uses urinal at bedside. Scattered petechia to BLE, R forearm scratch with minimal swelling from a dogs claw. Scattered bruising. Tolerating regular diet but w/ poor appetite. Dyspnea on exertion, encouraging IS every hr.  Abdm soft, non tender. Ax1 Gb/walker. PT transferring to station 66. Gave report to 66 nurse.

## 2023-06-04 NOTE — CONSULTS
SPIRITUAL HEALTH SERVICES Progress Note  [facility]    Referral Source: Advanced Care Planning Consult    Provided education, information, and Health care directive documents pertaining to advanced care planning at request of Pt Jack. Jack expressed understanding, welcomed materials, and will let staff know if he would like to complete while hospitalized.    Plan: Please re-consult if Jack decides to complete Health Care Directive while hospitalized. [facility] remains available.    Heather Vazquez MDiv  Chaplain Resident  [facility] Pager: [phone]    [facility] available 24/7 for emergent requests/referrals, either by having the on-call  paged or by entering an ASAP/STAT consult in Epic (this will also page the on-call ).

## 2023-06-04 NOTE — PROGRESS NOTES
[facility]    Medicine Progress Note - Hospitalist Service    Date of Admission:  6/2/2023    Assessment & Plan   Michele Gan is a 72 year old male admitted on 6/2/2023. He presents to the emergency department for evaluation of 2 days shortness of breath in the setting of underlying severe COPD.  Admitted for likely COPD exacerbation    Acute COPD exacerbation:  Acute on chronic hypoxic hypercarbic respiratory failure:  Severe chronic obstructive lung disease:   Pt reports that he purchased a home oxygen concentrator and has been using around 3 L at night;  risk of hypercarbia and CO2 narcosis with overcorrection of oxygenation in the setting of underlying chronic lung disease was d/w him on admission and encouraged him to inform his pulmonary provider regarding his home O2 use at his upcoming visit as well. Bronchitic phenotype COPD with FEV1 of 38%, residual volume of 175%, DLCO 62%.  Severe emphysematous changes by imaging with scattered bronchial thickening.  Follows with pulmonary through [facility].  Negative COVID, RSV, and influenza. procalcitonin 0.08  -Continue BiPAP, wean as able  -Continued scheduled DuoNebs 4 times daily, as needed albuterol nebulizers available  -Continue prior to admission inhaled steroid/LABA  -IV azithromycin 500 mg daily for course of treatment, oximetry, oxygen as needed  -Received Solu-Medrol 125 mg in the ED -> Continued 62.5 mg every 12 hours and will transition to prednisone today since pt has remained off BIPAP  - home O2 eval  -continued tobacco cessation; quit in 2004  - transfer from [facility] [place] status to [facility] with tele    Pulmonary nodules:   As of May 2 2023 CT through [facility] system, was noted to have a growing right upper lobe nodule measuring 6 mm as well as a new 4 mm lingula nodular finding.  In addition, severe emphysema, scattered bronchial wall thickening, and numerous new scattered sub-4 mm tree-in-bud opacities in bilateral lower and  middle lobes.  -HIV negative in April 2022, though syphilis antibodies positive; RPR negative.  Followed by ID service at [place] and treated with PCN by chart review.  -Patient is aware of plan for follow-up CT imaging in 3 months  -Patient reports he is due for follow-up with his pulmonary provider.  Had plans to schedule this as he returned to [place] from vacation    Petechiae, purpura:   Patient with several nontender, nonblanching purpuric lesions involving his forearms bilaterally.  New findings for him over the past weeks and associated with multiple episodes of minor trauma.  On aspirin, though no blood thinners.  Normal platelet count, normal LFTs.  Here primarily for what appears to be a COPD exacerbation, though also with history of pulmonary infiltrates early May, elevated eosinophils.  Consider Churg-Titi, as this could result in patient's peripheral eosinophilia (though only mild), purpura, and pulmonary findings including contributions to his obstructive lung disease and impaired DLCO as well as pulmonary findings with nonnecrotizing pulmonary nodules noted on May CT.  Mild eosinophilia and the fact that patient's purpura were exacerbated by small injuries are reassuring that this is not a primary vasculitis.  Plan:  -UA negative for blood or infection  -ANCA pending   -Currently on steroids as above which will likely result in resolution of peripheral eosinophilia.  -If any hemoptysis, would consult pulmonary for consideration of BAL         Diet: Advance Diet as Tolerated: Regular Diet Adult    DVT Prophylaxis: Pneumatic Compression Devices  Albarran Catheter: Not present  Lines: None     Cardiac Monitoring: None  Code Status: Full Code        Disposition Plan      Expected Discharge Date: 06/06/2023,  3:00 PM                Ileana Parker MD  Hospitalist Service  [facility]  Text page via [facility] Paging/Directory    ______________________________________________________________________    Interval History   Pt has remained off BIPAP overnight. Feels that his breathing is doing better compared to yesterday. Was able to stand up to get his weight taken without worsening SOB like yesterday. Does still have a cough productive of sputum.    Physical Exam   Vital Signs: Temp: 98  F (36.7  C) Temp src: Oral BP: 125/80 Pulse: 84   Resp: 18 SpO2: 94 % O2 Device: Nasal cannula Oxygen Delivery: 2 LPM  Weight: 155 lbs 6.79 oz    Constitutional: WDWN male lying in bed in NAD  HEENT: NC/AT, no scleral icterus, nl conjunctiva, BIPAP mask in place  Respiratory: good inspiratory effort, lungs with good air movement, bilateral end exp wheezes  Cardiovascular: RRR, no M/R/G, 2+ radial pulses  GI: soft, nondistended  Skin: no rashes, warm, dry  Neuro: CN 2-12 grossly intact  MSK: nl strength, able to move all 4 exts  Psych: nl mood/affect/judgment.      Medical Decision Making       50 MINUTES SPENT BY ME on the date of service doing chart review, history, exam, documentation & further activities per the note.      Data     I have personally reviewed the following data over the past 24 hrs:    12.0 (H)  \   14.8   / 204     140 105 11.8 /  131 (H)   4.3 26 0.60 (L) \       Imaging results reviewed over the past 24 hrs:   No results found for this or any previous visit (from the past 24 hour(s)).

## 2023-06-05 ENCOUNTER — APPOINTMENT (OUTPATIENT)
Dept: GENERAL RADIOLOGY | Facility: CLINIC | Age: 73
DRG: 190 | End: 2023-06-05
Attending: NURSE PRACTITIONER
Payer: COMMERCIAL

## 2023-06-05 ENCOUNTER — APPOINTMENT (OUTPATIENT)
Dept: CT IMAGING | Facility: CLINIC | Age: 73
DRG: 190 | End: 2023-06-05
Attending: INTERNAL MEDICINE
Payer: COMMERCIAL

## 2023-06-05 LAB
ANCA AB PATTERN SER IF-IMP: NORMAL
ANION GAP SERPL CALCULATED.3IONS-SCNC: 8 MMOL/L (ref 7–15)
BASOPHILS # BLD AUTO: 0 10E3/UL (ref 0–0.2)
BASOPHILS NFR BLD AUTO: 0 %
BUN SERPL-MCNC: 14.4 MG/DL (ref 8–23)
C-ANCA TITR SER IF: NORMAL {TITER}
CALCIUM SERPL-MCNC: 8.5 MG/DL (ref 8.8–10.2)
CHLORIDE SERPL-SCNC: 105 MMOL/L (ref 98–107)
CREAT SERPL-MCNC: 0.67 MG/DL (ref 0.67–1.17)
DEPRECATED HCO3 PLAS-SCNC: 28 MMOL/L (ref 22–29)
EOSINOPHIL # BLD AUTO: 0 10E3/UL (ref 0–0.7)
EOSINOPHIL NFR BLD AUTO: 0 %
ERYTHROCYTE [DISTWIDTH] IN BLOOD BY AUTOMATED COUNT: 13.2 % (ref 10–15)
GFR SERPL CREATININE-BSD FRML MDRD: >90 ML/MIN/1.73M2
GLUCOSE SERPL-MCNC: 87 MG/DL (ref 70–99)
HCT VFR BLD AUTO: 46.2 % (ref 40–53)
HGB BLD-MCNC: 15.3 G/DL (ref 13.3–17.7)
IMM GRANULOCYTES # BLD: 0 10E3/UL
IMM GRANULOCYTES NFR BLD: 0 %
LYMPHOCYTES # BLD AUTO: 1.3 10E3/UL (ref 0.8–5.3)
LYMPHOCYTES NFR BLD AUTO: 18 %
MCH RBC QN AUTO: 28.8 PG (ref 26.5–33)
MCHC RBC AUTO-ENTMCNC: 33.1 G/DL (ref 31.5–36.5)
MCV RBC AUTO: 87 FL (ref 78–100)
MONOCYTES # BLD AUTO: 0.6 10E3/UL (ref 0–1.3)
MONOCYTES NFR BLD AUTO: 8 %
NEUTROPHILS # BLD AUTO: 5.1 10E3/UL (ref 1.6–8.3)
NEUTROPHILS NFR BLD AUTO: 74 %
NRBC # BLD AUTO: 0 10E3/UL
NRBC BLD AUTO-RTO: 0 /100
NT-PROBNP SERPL-MCNC: 184 PG/ML (ref 0–900)
PLATELET # BLD AUTO: 185 10E3/UL (ref 150–450)
POTASSIUM SERPL-SCNC: 3.5 MMOL/L (ref 3.4–5.3)
RBC # BLD AUTO: 5.31 10E6/UL (ref 4.4–5.9)
SODIUM SERPL-SCNC: 141 MMOL/L (ref 136–145)
TROPONIN T SERPL HS-MCNC: 13 NG/L
TROPONIN T SERPL HS-MCNC: 16 NG/L
WBC # BLD AUTO: 7 10E3/UL (ref 4–11)

## 2023-06-05 PROCEDURE — 71275 CT ANGIOGRAPHY CHEST: CPT

## 2023-06-05 PROCEDURE — 93010 ELECTROCARDIOGRAM REPORT: CPT | Performed by: INTERNAL MEDICINE

## 2023-06-05 PROCEDURE — 94640 AIRWAY INHALATION TREATMENT: CPT

## 2023-06-05 PROCEDURE — 99233 SBSQ HOSP IP/OBS HIGH 50: CPT | Performed by: INTERNAL MEDICINE

## 2023-06-05 PROCEDURE — 80048 BASIC METABOLIC PNL TOTAL CA: CPT | Performed by: HOSPITALIST

## 2023-06-05 PROCEDURE — 36415 COLL VENOUS BLD VENIPUNCTURE: CPT | Performed by: INTERNAL MEDICINE

## 2023-06-05 PROCEDURE — 999N000157 HC STATISTIC RCP TIME EA 10 MIN

## 2023-06-05 PROCEDURE — 250N000011 HC RX IP 250 OP 636: Performed by: INTERNAL MEDICINE

## 2023-06-05 PROCEDURE — 71045 X-RAY EXAM CHEST 1 VIEW: CPT

## 2023-06-05 PROCEDURE — 94640 AIRWAY INHALATION TREATMENT: CPT | Mod: 76

## 2023-06-05 PROCEDURE — 250N000011 HC RX IP 250 OP 636: Performed by: HOSPITALIST

## 2023-06-05 PROCEDURE — 36415 COLL VENOUS BLD VENIPUNCTURE: CPT | Performed by: HOSPITALIST

## 2023-06-05 PROCEDURE — 250N000009 HC RX 250: Performed by: HOSPITALIST

## 2023-06-05 PROCEDURE — 120N000001 HC R&B MED SURG/OB

## 2023-06-05 PROCEDURE — 250N000012 HC RX MED GY IP 250 OP 636 PS 637: Performed by: STUDENT IN AN ORGANIZED HEALTH CARE EDUCATION/TRAINING PROGRAM

## 2023-06-05 PROCEDURE — 83880 ASSAY OF NATRIURETIC PEPTIDE: CPT | Performed by: INTERNAL MEDICINE

## 2023-06-05 PROCEDURE — 250N000013 HC RX MED GY IP 250 OP 250 PS 637: Performed by: HOSPITALIST

## 2023-06-05 PROCEDURE — 85014 HEMATOCRIT: CPT | Performed by: HOSPITALIST

## 2023-06-05 PROCEDURE — 250N000009 HC RX 250: Performed by: INTERNAL MEDICINE

## 2023-06-05 PROCEDURE — 84484 ASSAY OF TROPONIN QUANT: CPT | Performed by: NURSE PRACTITIONER

## 2023-06-05 PROCEDURE — 93005 ELECTROCARDIOGRAM TRACING: CPT

## 2023-06-05 PROCEDURE — 84484 ASSAY OF TROPONIN QUANT: CPT | Performed by: INTERNAL MEDICINE

## 2023-06-05 PROCEDURE — 99291 CRITICAL CARE FIRST HOUR: CPT | Performed by: NURSE PRACTITIONER

## 2023-06-05 RX ORDER — METHYLPREDNISOLONE SODIUM SUCCINATE 40 MG/ML
40 INJECTION, POWDER, LYOPHILIZED, FOR SOLUTION INTRAMUSCULAR; INTRAVENOUS EVERY 12 HOURS
Status: DISCONTINUED | OUTPATIENT
Start: 2023-06-05 | End: 2023-06-06

## 2023-06-05 RX ORDER — IOPAMIDOL 755 MG/ML
61 INJECTION, SOLUTION INTRAVASCULAR ONCE
Status: COMPLETED | OUTPATIENT
Start: 2023-06-05 | End: 2023-06-05

## 2023-06-05 RX ORDER — BUDESONIDE 0.5 MG/2ML
0.5 INHALANT ORAL EVERY 12 HOURS
Status: DISCONTINUED | OUTPATIENT
Start: 2023-06-05 | End: 2023-06-07 | Stop reason: HOSPADM

## 2023-06-05 RX ADMIN — METHYLPREDNISOLONE SODIUM SUCCINATE 40 MG: 40 INJECTION, POWDER, FOR SOLUTION INTRAMUSCULAR; INTRAVENOUS at 20:28

## 2023-06-05 RX ADMIN — IPRATROPIUM BROMIDE AND ALBUTEROL SULFATE 3 ML: .5; 3 SOLUTION RESPIRATORY (INHALATION) at 19:47

## 2023-06-05 RX ADMIN — PREDNISONE 40 MG: 20 TABLET ORAL at 08:24

## 2023-06-05 RX ADMIN — AZITHROMYCIN MONOHYDRATE 500 MG: 500 INJECTION, POWDER, LYOPHILIZED, FOR SOLUTION INTRAVENOUS at 05:30

## 2023-06-05 RX ADMIN — SODIUM CHLORIDE 85 ML: 9 INJECTION, SOLUTION INTRAVENOUS at 12:26

## 2023-06-05 RX ADMIN — IOPAMIDOL 61 ML: 755 INJECTION, SOLUTION INTRAVENOUS at 12:26

## 2023-06-05 RX ADMIN — IPRATROPIUM BROMIDE AND ALBUTEROL SULFATE 3 ML: .5; 3 SOLUTION RESPIRATORY (INHALATION) at 11:23

## 2023-06-05 RX ADMIN — BUDESONIDE 0.5 MG: 0.5 INHALANT RESPIRATORY (INHALATION) at 19:47

## 2023-06-05 RX ADMIN — IPRATROPIUM BROMIDE AND ALBUTEROL SULFATE 3 ML: .5; 3 SOLUTION RESPIRATORY (INHALATION) at 07:48

## 2023-06-05 RX ADMIN — ZOLPIDEM TARTRATE 5 MG: 5 TABLET ORAL at 21:57

## 2023-06-05 RX ADMIN — IPRATROPIUM BROMIDE AND ALBUTEROL SULFATE 3 ML: .5; 3 SOLUTION RESPIRATORY (INHALATION) at 15:58

## 2023-06-05 ASSESSMENT — ACTIVITIES OF DAILY LIVING (ADL)
ADLS_ACUITY_SCORE: 28
ADLS_ACUITY_SCORE: 26
ADLS_ACUITY_SCORE: 26
ADLS_ACUITY_SCORE: 28
ADLS_ACUITY_SCORE: 28
ADLS_ACUITY_SCORE: 26
ADLS_ACUITY_SCORE: 28
ADLS_ACUITY_SCORE: 26
ADLS_ACUITY_SCORE: 28
ADLS_ACUITY_SCORE: 28
ADLS_ACUITY_SCORE: 26
ADLS_ACUITY_SCORE: 26

## 2023-06-05 NOTE — CODE/RAPID RESPONSE
[facility]    RRT Note  6/5/2023   Time Called: 0647    RRT called for: Chest Pain.    Code Status: Full Code    I was called to evaluate Michele Gan, who is a 72 year old male who was admitted on 6/2/2023 for several days of shortness of breath in the setting of a likely COPD exacerbation. He has a past medical history of known severe COPD with DLCO of 62%, FEV1 of 38%. As of May 2, 2023 patient did have a chest CT with scattered lung nodules as well as bilateral bibasilar nodular and groundglass opacities.    Cardiac Risk Factors?   Sex: Male ?   Tobacco: None since 2004 ?   Hypertension: No  Diabetes: No  Hyperlipidemia: No  Family History: Unknown    PE/DVT Risk Factors?   Personal History: None   Recent Travel: None  Recent Surgery/Hospitalization: Hospitalized since 6/2/2023  Other Immobilization:  None  Tobacco: None  Family History: Unknown   Hormone Use: None    Cancer: None    Trauma: None?       Assessment & Plan    Chest Discomfort  RRT called for approximately 10 minutes of chest discomfort. Upon RRT arrival, patient found lying in bed, conversing with bedside RN. Patient rates chest pain 6/10, states it started in the middle of his chest and radiated to his L chest wall. Pain is associated with mild SOB. Pain is/not reproducible. Appears to be in no acute distress. Initial vital signs include /85, HR 66, SpO2 99% on 2L. Chest discomfort is associated with feelings of worsening dyspnea. Denies nausea/vomiting. Speaking in clear, full sentences. Skin is warm and dry. Lungs with mild bilateral end expiratory wheezes. RRR. No murmur, rub or gallop. No peripheral edema.     Differentials:   ACS: Patient has no h/o ACS, EKG NSR, troponin negative   PE: Patient with slightly increased oxygen requirements, however negative Indy's and no significantly increased risk factors for PE/DVT. Quit smoking in 2004. Should patient's O2 requirements increase or chest pain return, would  strongly consider CT r/o PE at that time.     INTERVENTIONS:  -EKG  -Troponin  -CXR  -Duoneb     At the conclusion of this RRT patient had completed his duoneb and his chest discomfort had completely resolved. He was hemodynamically stable and will remain on current unit. Please do not hesitate to contact House NP should patient's symptoms reoccur or his oxygen requirements increase.     Discussed with and defer further cares to Dr. Cooper.     UBALDO Celis CNP  [facility]  Securely message with the Vocera Web Console (learn more here)  Text page via [employer] Paging/Directory      Physical Exam   Vital Signs with Ranges:  Temp:  [97.4  F (36.3  C)-98.7  F (37.1  C)] 97.4  F (36.3  C)  Pulse:  [] 75  Resp:  [16-20] 16  BP: (109-133)/(68-82) 120/82  SpO2:  [91 %-100 %] 95 %  I/O last 3 completed shifts:  In: 120 [P.O.:120]  Out: 850 [Urine:850]          Data     EKG:  Interpreted by UBALDO Celis CNP  Time reviewed: 0705  Symptoms at time of EKG: L sided chest pain    Rhythm: normal sinus   Rate: 65bpm  Axis: Normal  Ectopy: none  Conduction: normal  ST Segments/ T Waves: No acute ischemic changes  Q Waves: none  Comparison to prior: Normal sinus compared to sinus tachycardia     Clinical Impression: normal EKG    IMAGING: (X-ray/CT/MRI)   No results found for this or any previous visit (from the past 24 hour(s)).    CBC with Diff:  Recent Labs   Lab Test 06/04/23  0601   WBC 12.0*   HGB 14.8   MCV 88         No results found for: RETICABSCT  No results found for: RETP    Comprehensive Metabolic Panel:  Recent Labs   Lab 06/04/23  0601 06/02/23  0527 06/02/23  0144     --  142   POTASSIUM 4.3  --  3.9   CHLORIDE 105  --  102   CO2 26  --  27   ANIONGAP 9  --  13   *   < > 140*   BUN 11.8  --  6.6*   CR 0.60*  --  0.59*   GFRESTIMATED >90  --  >90   TOBIAS 8.6*  --  9.2   PROTTOTAL  --   --  7.5   ALBUMIN  --   --  4.6   BILITOTAL  --   --  0.6   ALKPHOS   --   --  60   AST  --   --  25   ALT  --   --  15    < > = values in this interval not displayed.         Time Spent on this Encounter     Medical Decision Making                 CRITICAL CARE TIME  I spent 30 minutes (0700 - 0730) of critical care time on the unit/floor managing the care of Michele Gan. Upon evaluation, this patient had a high probability of imminent or life-threatening deterioration due to chest pain and dyspnea which required my direct attention, intervention, and personal management. 100% of my time was spent at the bedside counseling the patient and/or coordinating care regarding services listed in this note.

## 2023-06-05 NOTE — PLAN OF CARE
Goal Outcome Evaluation:         Cognitive Concerns/ Orientation: A&Ox4   BEHAVIOR & AGGRESSION TOOL COLOR: Green  CIWA SCORE: N/A   ABNL VS/O2:  VSS on RA ex HTN/tachy at times. SALMERON. On 2L NC.  MOBILITY: Ax1 GB/W, walked around the unit   PAIN MANAGMENT: Denies pain  DIET: Regular-poor appetite. Denies N/V.   BOWEL/BLADDER: Uses bedside commode and urinal.   ABNL LAB/BG: Pending ANCA IgG  DRAIN/DEVICES: 2x PIV S/L; intermittent IV abx  TELEMETRY RHYTHM: N/A  SKIN: Scattered bruises, petechia on LE + scratch on R arm.  TESTS/PROCEDURES: ECHO to be done.  Discharge pending progress  OTHER IMPORTANT INFO: LS diminished with expiratory wheezing as that bases. Receiving nebs, RT following.CT chest done.Pulmonology following.

## 2023-06-05 NOTE — PLAN OF CARE
Goal Outcome Evaluation:      Plan of Care Reviewed With: patient    Summary: COPD exacerbation   DATE & TIME: 6/4/2023, [id number]    Cognitive Concerns/ Orientation: A&Ox4   BEHAVIOR & AGGRESSION TOOL COLOR: Green  CIWA SCORE: N/A   ABNL VS/O2:  VSS on RA ex HTN/tachy at times. SALMERON. On 2L NC.  MOBILITY: Ax1 GB/W, walked around the unit this evening  PAIN MANAGMENT: Denies pain  DIET: Regular-poor appetite. Denies N/V.   BOWEL/BLADDER: Uses bedside commode and urinal.   ABNL LAB/BG: Pending ANCA IgG  DRAIN/DEVICES: 2x PIV S/L; intermittent IV abx  TELEMETRY RHYTHM: N/A  SKIN: Scattered bruises, petechia on LE + scratch on R arm.  TESTS/PROCEDURES: N/A this shift  Discharge pending progress  OTHER IMPORTANT INFO: LS diminished with expiratory wheezing as that bases. Receiving nebs, RT following

## 2023-06-05 NOTE — PROGRESS NOTES
[facility]    Medicine Progress Note - Hospitalist Service    Date of Admission:  6/2/2023    Assessment & Plan   Michele Gan is a 72 year old male admitted on 6/2/2023. He presents to the emergency department for evaluation of 2 days shortness of breath in the setting of underlying severe COPD. Admitted for likely COPD exacerbation     Acute COPD exacerbation:  Acute on chronic hypoxic hypercarbic respiratory failure:  Severe chronic obstructive lung disease:   Pt reports that he purchased a home oxygen concentrator and has been using around 3 L at night;  risk of hypercarbia and CO2 narcosis with overcorrection of oxygenation in the setting of underlying chronic lung disease was d/w him on admission and encouraged him to inform his pulmonary provider regarding his home O2 use at his upcoming visit as well. Bronchitic phenotype COPD with FEV1 of 38%, residual volume of 175%, DLCO 62%.  Severe emphysematous changes by imaging with scattered bronchial thickening.  Follows with pulmonary through [facility].  Negative COVID, RSV, and influenza. procalcitonin 0.08  -Has been on intermittent BiPAP.  -Continued scheduled DuoNebs 4 times daily, as needed albuterol nebulizers available  -Continue prior to admission inhaled steroid/LABA  -IV azithromycin 500 mg daily for course of treatment, oximetry, oxygen as needed  -Received Solu-Medrol 125 mg in the ED -> Continued 62.5 mg every 12 hours and transition to prednisone yesterday.  This morning had RRT and still very wheezy.  *We will get pulmonology consult.  And CT chest.  -continued tobacco cessation; quit in 2004  - [facility] to [facility] with tele     Pulmonary nodules:   As of May 2 2023 CT through [facility] system, was noted to have a growing right upper lobe nodule measuring 6 mm as well as a new 4 mm lingula nodular finding.  In addition, severe emphysema, scattered bronchial wall thickening, and numerous new scattered  sub-4 mm tree-in-bud opacities in bilateral lower and middle lobes.  -HIV negative in April 2022, though syphilis antibodies positive; RPR negative.  Followed by ID service at [facility] and treated with PCN by chart review.  -Patient is aware of plan for follow-up CT imaging in 3 months  -Patient reports he is due for follow-up with his pulmonary provider.  Had plans to schedule this as he returned to [place] from vacation     Petechiae, purpura:   Patient with several nontender, nonblanching purpuric lesions involving his forearms bilaterally.  New findings for him over the past weeks and associated with multiple episodes of minor trauma.  On aspirin, though no blood thinners.  Normal platelet count, normal LFTs.  Here primarily for what appears to be a COPD exacerbation, though also with history of pulmonary infiltrates early May, elevated eosinophils.  Consider [place], as this could result in patient's peripheral eosinophilia (though only mild), purpura, and pulmonary findings including contributions to his obstructive lung disease and impaired DLCO as well as pulmonary findings with nonnecrotizing pulmonary nodules noted on May CT.  Mild eosinophilia and the fact that patient's purpura were exacerbated by small injuries are reassuring that this is not a primary vasculitis.  Plan:  -UA negative for blood or infection  -ANCA pending   -Currently on steroids as above which will likely result in resolution of peripheral eosinophilia.  -If any hemoptysis, would consult pulmonary for consideration of BAL      Elevated troponin: ?  Nonischemic myocardial injury  -Increased from 19-95 on admission, but I could not find any acknowledgment of same in previous notes.  I am covering only today.  Recheck 2 troponin that were negative.  Will get echocardiogram to evaluate for any wall motion abnormality.  If WMA then will get inpatient cardiology consult otherwise may need outpatient cardiology review     Diet: Advance  "Diet as Tolerated: Regular Diet Adult    DVT Prophylaxis: Pneumatic Compression Devices  Albarran Catheter: Not present  Lines: None     Cardiac Monitoring: None  Code Status: Full Code      Clinically Significant Risk Factors                                  Disposition Plan      Expected Discharge Date: 06/06/2023,  3:00 PM                Hubert Cooper MD  Hospitalist Service  [facility]  Securely message with [employer] (more info)  Text page via [employer] Paging/Directory   ______________________________________________________________________    Interval History   Last night events noted.  This morning had RRT and case was discussed with RRT NP.  Patient feels better now.  Denies any further chest pain    Physical Exam   /80 (BP Location: Right arm)   Pulse 73   Temp 97.8  F (36.6  C) (Oral)   Resp 16   Ht 1.727 m (5' 8\")   Wt 70.4 kg (155 lb 1.6 oz)   SpO2 97%   BMI 23.58 kg/m    Gen- pleasant   HEENT- NAD, SHARON  Neck- supple  CVS- I+II+ no m/r/g  RS-diffuse wheeze.  No crackles.  Desaturating in low 80s on exertion as per RN  Abdo- soft, no tenderness . No g/r/r  Ext- no edema       Medical Decision Making       55 MINUTES SPENT BY ME on the date of service doing chart review, history, exam, documentation & further activities per the note.      Data   ------------------------- PAST 24 HR DATA REVIEWED -----------------------------------------------    I have personally reviewed the following data over the past 24 hrs:    7.0  \   15.3   / 185     141 105 14.4 /  87   3.5 28 0.67 \       Trop: 13 BNP: 184       Imaging results reviewed over the past 24 hrs:   Recent Results (from the past 24 hour(s))   XR Chest Port 1 View    Narrative    XR CHEST PORT 1 VIEW 6/5/2023 7:12 AM    HISTORY: Chest pain    COMPARISON: Chest x-ray dated 6/2/2023.      Impression    IMPRESSION: EKG wires overlie the chest. Otherwise negative chest.    TONI JARAMILLO MD         SYSTEM ID:  [id number]   CT " Chest Pulmonary Embolism w Contrast    Narrative    CT CHEST PULMONARY EMBOLISM WITH CONTRAST 6/5/2023 12:37 PM    CLINICAL HISTORY: Shortness of breath. Chest pain. Rule out pulmonary  embolism    TECHNIQUE: CT angiogram chest during arterial phase injection IV  contrast. 2D and 3D MIP reconstructions were performed by the CT  technologist. Dose reduction techniques were used.     CONTRAST:  61 mL isovue 370    COMPARISON: None.    FINDINGS:  ANGIOGRAM CHEST: Pulmonary arteries are normal caliber and negative  for pulmonary emboli. Thoracic aorta is negative for dissection. No CT  evidence of right heart strain.    LUNGS AND PLEURA: Moderate to severe centrilobular emphysema.  Calcified granuloma right upper lobe. There is mucous plugging in the  lower lobe bronchi with mild peribronchial reticular nodular  infiltrates in both lower lobes, left greater than right.    MEDIASTINUM/AXILLAE: No lymphadenopathy. There are calcified right  hilar lymph nodes compatible with old granulomatous disease.    UPPER ABDOMEN: Normal.    MUSCULOSKELETAL: Normal.      Impression    IMPRESSION:  1.  No evidence of pulmonary embolus.  2.  Mucous plugging with mild peribronchial infiltrates in the lower  lobes bilaterally suggesting mild pneumonitis, possibly aspiration  pneumonitis.  3.  Moderate to severe emphysema.

## 2023-06-05 NOTE — PLAN OF CARE
Goal Outcome Evaluation:         Summary: COPD exacerbation   DATE & TIME: 6/4/2023-6/5/23 3700-5334   Cognitive Concerns/ Orientation: A&Ox4   BEHAVIOR & AGGRESSION TOOL COLOR: Green  CIWA SCORE: N/A   ABNL VS/O2:  VSS on RA ex HTN/tachy at times. SALMERON. On 2L NC.  MOBILITY: Ax1 GB/W, walked around the unit this evening  PAIN MANAGMENT: Denies pain  DIET: Regular-poor appetite. Denies N/V.   BOWEL/BLADDER: Uses bedside commode and urinal.   ABNL LAB/BG: Pending ANCA IgG  DRAIN/DEVICES: 2x PIV S/L; intermittent IV abx  TELEMETRY RHYTHM: N/A  SKIN: Scattered bruises, petechia on LE + scratch on R arm.  TESTS/PROCEDURES: N/A this shift  Discharge pending progress  OTHER IMPORTANT INFO: LS diminished with expiratory wheezing as that bases. Receiving nebs, RT following

## 2023-06-05 NOTE — CONSULTS
"PULMONOLOGY CONSULTATION  Date of service: 6/5/2023    [facility]  _____________________________________________________    Michele Gan  72 year old male  [id number]  [address] [place]   [facility] [address]    Primary Care Provider:  No Ref-Primary, Physician  Admission Date: 6/2/2023  Hospital Attending Physician:  Ileana Parker MD  ________________________________________    CHIEF COMPLAINT : I was asked to see this patient by Ileana Parker MD for evaluation of COPD exacerbation.     Informant: EHR and patient    HISTORY OF PRESENT ILLNESS     Per admission H&P:    \"Michele Gan is a 72 year old male who presents to the emergency department with a 2-day history of increased shortness of breath.     Patient has a history of known severe COPD with DLCO of 62%, FEV1 of 38%.  Follows with pulmonary through [facility].  As of May 2, 2023 patient did have a chest CT with scattered lung nodules as well as bilateral bibasilar nodular and groundglass opacities.  He is due for follow-up CT in August.     Patient was recently in [place] on vacation.  He stayed for 5 days and a fancy camper.  [facility] and though he had no prior history of allergies to cats, wondered if he might be allergic as 2 days ago he developed acute shortness of breath.  This was similar to his prior COPD exacerbations, though believes it was more severe.  He noticed that he was breathing heavily, felt as though he might pass out.  When he arrived back in [place] today, presented the emergency department for evaluation and was found to have hypoxic hypercarbic respiratory failure and placed on BiPAP.  He is feeling better on BiPAP.  He has a chronic cough with no significant sputum production.  Does not believe his cough has changed.  No fevers, no chills.  He has not noted any change in urination, no blood in his urine.     He does have some chest pressure.  Initial " "troponin within normal limits.  No prior history of heart disease.  Tells me that he completed a cardiac stress test many years ago which was negative.     Patient takes a daily aspirin.  No other blood thinners.  He has some petechiae on his lower extremities which he noticed the other day, he is uncertain of the duration of their presence.  He also has some purpura on his forearms bilaterally which he believes are associated with very minor trauma.  Had had not had issues with this before.  He does have a scratch on his right forearm from a dog; this injury occurred yesterday, after other purpura had been noted.     He is not able to identify any clear trigger for onset of his shortness of breath.  He has not felt clammy, no shoulder pain, no fevers, no chills.     For the past 2 days, he has not eaten well because he has been short of breath.  Feels dehydrated.  Tachycardic in the 120 range and will be receiving an IV saline bolus with reassessment of heart rate\"      HOME MEDICATIONS     Medications Prior to Admission   Medication Sig Dispense Refill Last Dose     albuterol (PROAIR HFA/PROVENTIL HFA/VENTOLIN HFA) 108 (90 Base) MCG/ACT inhaler Inhale 1-2 puffs into the lungs every 6 hours as needed for shortness of breath, wheezing or cough    at prn     budesonide (PULMICORT) 0.25 MG/2ML neb solution Take 0.25 mg by nebulization 2 times daily as needed    at prn     citalopram (CELEXA) 40 MG tablet Take 40 mg by mouth At Bedtime   5/31/2023 at hs     ipratropium - albuterol 0.5 mg/2.5 mg/3 mL (DUONEB) 0.5-2.5 (3) MG/3ML neb solution Take 1 vial by nebulization every 6 hours as needed for shortness of breath, wheezing or cough    at prn     tamsulosin (FLOMAX) 0.4 MG capsule Take 0.4 mg by mouth At Bedtime   5/31/2023 at hs     zolpidem (AMBIEN) 5 MG tablet Take 5 mg by mouth At Bedtime   5/31/2023 at hs       PAST MEDICAL HISTORY    No past medical history on file.    PAST SURGICAL HISTORY      No past surgical " "history on file.    ALLERGIES   Not on File    SOCIAL / SUBSTANCE HISTORY     Social History     Socioeconomic History     Marital status: Single     Spouse name: Not on file     Number of children: Not on file     Years of education: Not on file     Highest education level: Not on file   Occupational History     Not on file   Tobacco Use     Smoking status: Not on file     Smokeless tobacco: Not on file   Substance and Sexual Activity     Alcohol use: Not on file     Drug use: Not on file     Sexual activity: Not on file   Other Topics Concern     Not on file   Social History Narrative     Not on file     Social Determinants of Health     Financial Resource Strain: Not on file   Food Insecurity: Not on file   Transportation Needs: Not on file   Physical Activity: Not on file   Stress: Not on file   Social Connections: Not on file   Intimate Partner Violence: Not on file   Housing Stability: Not on file       FAMILY HISTORY   No family history on file.    REVIEW OF SYSTEMS   A comprehensive review of systems was negative except for items noted in HPI/Subjective.    PHYSICAL EXAMINATION   Vital signs  Temp (24hrs), Av.7  F (36.5  C), Min:97.4  F (36.3  C), Max:98  F (36.7  C)    Temp: 97.8  F (36.6  C) Temp src: Oral BP: 112/80 Pulse: 73   Resp: 16 SpO2: 97 % O2 Device: Nasal cannula Oxygen Delivery: 1 LPM Height: 172.7 cm (5' 8\") Weight: 70.4 kg (155 lb 1.6 oz)  Estimated body mass index is 23.58 kg/m  as calculated from the following:    Height as of this encounter: 1.727 m (5' 8\").    Weight as of this encounter: 70.4 kg (155 lb 1.6 oz).  I/O last 3 completed shifts:  In: 120 [P.O.:120]  Out: 850 [Urine:850]    CONSTITUTIONAL/GENERAL: Alert male. No apparent distress.  EARS,NOSE,THROAT,MOUTH: External ears and nose overall normal. NC in place.  RESPIRATORY: Diffuse bilateral wheezing.  CARDIOVASCULAR: RRR, S1, S2.   PSYCHIATRIC: Appropriate mood and affect.     LABORATORY ASSESSMENT    Arterial Blood " Gas  Recent Labs   Lab 06/02/23  0152   PH 7.21*     CBC  Recent Labs   Lab 06/05/23  0736 06/04/23  0601 06/02/23  0144   WBC 7.0 12.0* 8.3   RBC 5.31 5.16 5.44   HGB 15.3 14.8 15.5   HCT 46.2 45.4 47.2   MCV 87 88 87   MCH 28.8 28.7 28.5   MCHC 33.1 32.6 32.8   RDW 13.2 13.0 13.2    204 187     BMP  Recent Labs   Lab 06/05/23  0736 06/04/23  0601 06/02/23  1655 06/02/23  0611 06/02/23  0527 06/02/23  0144    140  --   --   --  142   POTASSIUM 3.5 4.3  --   --   --  3.9   CHLORIDE 105 105  --   --   --  102   TOBIAS 8.5* 8.6*  --   --   --  9.2   CO2 28 26  --   --   --  27   BUN 14.4 11.8  --   --   --  6.6*   CR 0.67 0.60*  --   --   --  0.59*   GLC 87 131* 159* 136*   < > 140*    < > = values in this interval not displayed.     INRNo lab results found in last 7 days.   BNPNo lab results found in last 7 days.  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    Follows with [facility], last seen 4/2023 with PFTs showing severe obstruction (FEV1 38%), air trapping (%, %) and mild diffusion impairment (DLCO 62%).  Maintained on Trelegy.  LDCT 5/2023 showed severe emphysema, 6 mm RUL nodule, 4 mm lingular nodule, scattered tree-in-bud opacities.     Was recently camping in [place] and was around multiple cats to which she is allergic.  On admission was in hypercapnic and hypoxic respiratory failure requiring BiPAP.  BNP normal, mild leukocytosis.  CXR unremarkable. CT chest shows mod-severe emphysema, lower lobe mucus plugging, mild  bibasilar nodular infiltrates, no LAD.  Currently receiving DuoNebs, azithromycin, and prednisone 40 mg daily      PLAN:     Start Budesnone neb Q12h. Ordered.     Continue DuoNebs Q4h.    Stop Prednisone. Start Solumedrol 40mg Q12h. Ordered.     Azithromycin x5 days.     Walking oximetry prior to discharge.     Would eventually discharge with Anoro inhaler and DuoNebs PRN.    Follow up with Pulmonologist at [place].        Benito Hdez MD    [facility] / [facility]  Office: [phone]  Pager: [phone]

## 2023-06-06 ENCOUNTER — APPOINTMENT (OUTPATIENT)
Dept: CARDIOLOGY | Facility: CLINIC | Age: 73
DRG: 190 | End: 2023-06-06
Attending: INTERNAL MEDICINE
Payer: COMMERCIAL

## 2023-06-06 LAB
ANION GAP SERPL CALCULATED.3IONS-SCNC: 14 MMOL/L (ref 7–15)
BASOPHILS # BLD AUTO: 0 10E3/UL (ref 0–0.2)
BASOPHILS NFR BLD AUTO: 0 %
BUN SERPL-MCNC: 14 MG/DL (ref 8–23)
CALCIUM SERPL-MCNC: 9.2 MG/DL (ref 8.8–10.2)
CHLORIDE SERPL-SCNC: 103 MMOL/L (ref 98–107)
CREAT SERPL-MCNC: 0.58 MG/DL (ref 0.67–1.17)
DEPRECATED HCO3 PLAS-SCNC: 24 MMOL/L (ref 22–29)
EOSINOPHIL # BLD AUTO: 0 10E3/UL (ref 0–0.7)
EOSINOPHIL NFR BLD AUTO: 0 %
ERYTHROCYTE [DISTWIDTH] IN BLOOD BY AUTOMATED COUNT: 13.2 % (ref 10–15)
GFR SERPL CREATININE-BSD FRML MDRD: >90 ML/MIN/1.73M2
GLUCOSE SERPL-MCNC: 131 MG/DL (ref 70–99)
HCT VFR BLD AUTO: 47.3 % (ref 40–53)
HGB BLD-MCNC: 15.8 G/DL (ref 13.3–17.7)
IMM GRANULOCYTES # BLD: 0 10E3/UL
IMM GRANULOCYTES NFR BLD: 1 %
LVEF ECHO: NORMAL
LYMPHOCYTES # BLD AUTO: 0.5 10E3/UL (ref 0.8–5.3)
LYMPHOCYTES NFR BLD AUTO: 6 %
MCH RBC QN AUTO: 28.5 PG (ref 26.5–33)
MCHC RBC AUTO-ENTMCNC: 33.4 G/DL (ref 31.5–36.5)
MCV RBC AUTO: 85 FL (ref 78–100)
MONOCYTES # BLD AUTO: 0.3 10E3/UL (ref 0–1.3)
MONOCYTES NFR BLD AUTO: 3 %
NEUTROPHILS # BLD AUTO: 8 10E3/UL (ref 1.6–8.3)
NEUTROPHILS NFR BLD AUTO: 90 %
NRBC # BLD AUTO: 0 10E3/UL
NRBC BLD AUTO-RTO: 0 /100
PLATELET # BLD AUTO: 222 10E3/UL (ref 150–450)
POTASSIUM SERPL-SCNC: 3.8 MMOL/L (ref 3.4–5.3)
RBC # BLD AUTO: 5.55 10E6/UL (ref 4.4–5.9)
SODIUM SERPL-SCNC: 141 MMOL/L (ref 136–145)
WBC # BLD AUTO: 8.9 10E3/UL (ref 4–11)

## 2023-06-06 PROCEDURE — 94640 AIRWAY INHALATION TREATMENT: CPT | Mod: 76

## 2023-06-06 PROCEDURE — 99233 SBSQ HOSP IP/OBS HIGH 50: CPT | Performed by: INTERNAL MEDICINE

## 2023-06-06 PROCEDURE — G0463 HOSPITAL OUTPT CLINIC VISIT: HCPCS | Mod: 25

## 2023-06-06 PROCEDURE — 250N000013 HC RX MED GY IP 250 OP 250 PS 637: Performed by: HOSPITALIST

## 2023-06-06 PROCEDURE — 999N000208 ECHOCARDIOGRAM COMPLETE

## 2023-06-06 PROCEDURE — 999N000033 HC STATISTIC CHRONIC PULMONARY DISEASE SPECIALIST

## 2023-06-06 PROCEDURE — 250N000011 HC RX IP 250 OP 636: Performed by: INTERNAL MEDICINE

## 2023-06-06 PROCEDURE — 250N000009 HC RX 250: Performed by: INTERNAL MEDICINE

## 2023-06-06 PROCEDURE — 85025 COMPLETE CBC W/AUTO DIFF WBC: CPT | Performed by: HOSPITALIST

## 2023-06-06 PROCEDURE — 120N000001 HC R&B MED SURG/OB

## 2023-06-06 PROCEDURE — 999N000032 HC STATISTIC CHRONIC DISEASE SPECIALIST RT CONSULT

## 2023-06-06 PROCEDURE — 999N000157 HC STATISTIC RCP TIME EA 10 MIN

## 2023-06-06 PROCEDURE — 36415 COLL VENOUS BLD VENIPUNCTURE: CPT | Performed by: HOSPITALIST

## 2023-06-06 PROCEDURE — 94640 AIRWAY INHALATION TREATMENT: CPT

## 2023-06-06 PROCEDURE — 250N000013 HC RX MED GY IP 250 OP 250 PS 637: Performed by: INTERNAL MEDICINE

## 2023-06-06 PROCEDURE — 93306 TTE W/DOPPLER COMPLETE: CPT | Mod: 26 | Performed by: INTERNAL MEDICINE

## 2023-06-06 PROCEDURE — 80048 BASIC METABOLIC PNL TOTAL CA: CPT | Performed by: HOSPITALIST

## 2023-06-06 PROCEDURE — 255N000002 HC RX 255 OP 636: Performed by: INTERNAL MEDICINE

## 2023-06-06 RX ORDER — ACETYLCYSTEINE 200 MG/ML
2 SOLUTION ORAL; RESPIRATORY (INHALATION) EVERY 4 HOURS
Status: DISCONTINUED | OUTPATIENT
Start: 2023-06-06 | End: 2023-06-06

## 2023-06-06 RX ORDER — ACETYLCYSTEINE 200 MG/ML
2 SOLUTION ORAL; RESPIRATORY (INHALATION) EVERY 6 HOURS
Status: DISCONTINUED | OUTPATIENT
Start: 2023-06-06 | End: 2023-06-07 | Stop reason: HOSPADM

## 2023-06-06 RX ORDER — IPRATROPIUM BROMIDE AND ALBUTEROL SULFATE 2.5; .5 MG/3ML; MG/3ML
3 SOLUTION RESPIRATORY (INHALATION)
Status: DISCONTINUED | OUTPATIENT
Start: 2023-06-06 | End: 2023-06-07 | Stop reason: HOSPADM

## 2023-06-06 RX ORDER — METHYLPREDNISOLONE SODIUM SUCCINATE 125 MG/2ML
62.5 INJECTION, POWDER, LYOPHILIZED, FOR SOLUTION INTRAMUSCULAR; INTRAVENOUS EVERY 12 HOURS
Status: DISCONTINUED | OUTPATIENT
Start: 2023-06-06 | End: 2023-06-07 | Stop reason: HOSPADM

## 2023-06-06 RX ORDER — ACYCLOVIR 200 MG/1
10 CAPSULE ORAL ONCE
Status: DISCONTINUED | OUTPATIENT
Start: 2023-06-06 | End: 2023-06-07 | Stop reason: HOSPADM

## 2023-06-06 RX ORDER — ALBUTEROL SULFATE 90 UG/1
2 AEROSOL, METERED RESPIRATORY (INHALATION) EVERY 6 HOURS PRN
Status: DISCONTINUED | OUTPATIENT
Start: 2023-06-06 | End: 2023-06-07 | Stop reason: HOSPADM

## 2023-06-06 RX ADMIN — IPRATROPIUM BROMIDE AND ALBUTEROL SULFATE 3 ML: .5; 3 SOLUTION RESPIRATORY (INHALATION) at 20:02

## 2023-06-06 RX ADMIN — BUDESONIDE 0.5 MG: 0.5 INHALANT RESPIRATORY (INHALATION) at 07:26

## 2023-06-06 RX ADMIN — METHYLPREDNISOLONE SODIUM SUCCINATE 40 MG: 40 INJECTION, POWDER, FOR SOLUTION INTRAMUSCULAR; INTRAVENOUS at 08:05

## 2023-06-06 RX ADMIN — HUMAN ALBUMIN MICROSPHERES AND PERFLUTREN 6 ML: 10; .22 INJECTION, SOLUTION INTRAVENOUS at 13:26

## 2023-06-06 RX ADMIN — ACETYLCYSTEINE 2 ML: 200 INHALANT RESPIRATORY (INHALATION) at 20:02

## 2023-06-06 RX ADMIN — IPRATROPIUM BROMIDE AND ALBUTEROL SULFATE 3 ML: .5; 3 SOLUTION RESPIRATORY (INHALATION) at 07:26

## 2023-06-06 RX ADMIN — ACETYLCYSTEINE 2 ML: 200 INHALANT RESPIRATORY (INHALATION) at 11:25

## 2023-06-06 RX ADMIN — BUDESONIDE 0.5 MG: 0.5 INHALANT RESPIRATORY (INHALATION) at 20:02

## 2023-06-06 RX ADMIN — UMECLIDINIUM BROMIDE AND VILANTEROL TRIFENATATE 1 PUFF: 62.5; 25 POWDER RESPIRATORY (INHALATION) at 11:40

## 2023-06-06 RX ADMIN — ZOLPIDEM TARTRATE 5 MG: 5 TABLET ORAL at 21:53

## 2023-06-06 RX ADMIN — METHYLPREDNISOLONE SODIUM SUCCINATE 62.5 MG: 125 INJECTION, POWDER, FOR SOLUTION INTRAMUSCULAR; INTRAVENOUS at 20:54

## 2023-06-06 RX ADMIN — IPRATROPIUM BROMIDE AND ALBUTEROL SULFATE 3 ML: .5; 3 SOLUTION RESPIRATORY (INHALATION) at 11:25

## 2023-06-06 RX ADMIN — AZITHROMYCIN MONOHYDRATE 500 MG: 500 INJECTION, POWDER, LYOPHILIZED, FOR SOLUTION INTRAVENOUS at 05:59

## 2023-06-06 ASSESSMENT — ACTIVITIES OF DAILY LIVING (ADL)
ADLS_ACUITY_SCORE: 28
ADLS_ACUITY_SCORE: 24
ADLS_ACUITY_SCORE: 28
ADLS_ACUITY_SCORE: 24
ADLS_ACUITY_SCORE: 24
ADLS_ACUITY_SCORE: 28
ADLS_ACUITY_SCORE: 24
ADLS_ACUITY_SCORE: 28
ADLS_ACUITY_SCORE: 24
ADLS_ACUITY_SCORE: 28

## 2023-06-06 NOTE — PROGRESS NOTES
[id number]    Cognitive Concerns/ Orientation: A&Ox4   BEHAVIOR & AGGRESSION TOOL COLOR: Green  CIWA SCORE: N/A   ABNL VS/O2:  VSS on 2L NC   MOBILITY: Ax1 GB/W, walked around the unit   PAIN MANAGMENT: Denies pain  DIET: Regular  BOWEL/BLADDER: Uses bedside commode and urinal.   ABNL LAB/BG: N/A  DRAIN/DEVICES: 2x PIV S/L  TELEMETRY RHYTHM: N/A  SKIN: Scattered bruises, petechia on LE + scratch on R arm.  TESTS/PROCEDURES: None    OTHER IMPORTANT INFO: LS diminished with expiratory wheezing as that bases. Receiving nebs,.Pulmonology following.

## 2023-06-06 NOTE — PLAN OF CARE
Summary: COPD exacerbation   DATE & TIME: 6/5/2023- NOC  Cognitive Concerns/ Orientation: A&Ox4   BEHAVIOR & AGGRESSION TOOL COLOR: Green  CIWA SCORE: N/A   ABNL VS/O2:  VSS on RA ex HTN/tachy at times. SALMERON. On 2L NC.  MOBILITY: Ax1 GB/W,   PAIN MANAGMENT: Denies pain  DIET: Regular-poor appetite. Denies N/V.   BOWEL/BLADDER: Uses bedside commode and urinal.   ABNL LAB/BG: Pending ANCA IgG  DRAIN/DEVICES: 2x PIV S/L; intermittent IV abx  TELEMETRY RHYTHM: N/A  SKIN: Scattered bruises, petechia on LE + scratch on R arm.  TESTS/PROCEDURES: ECHO to be done.  Discharge pending progress  OTHER IMPORTANT INFO: LS diminished with expiratory wheezing as that bases. Receiving nebs, RT following.CT chest done.Pulmonology following.

## 2023-06-06 NOTE — PROGRESS NOTES
"PULMONOLOGY PROGRESS NOTE  [facility]      SUBJECTIVE  / INTERVAL EVENTS     Feeling stronger but continues to have difficulty breathing with chest tightness which  improves with neb therapy.    PHYSICAL EXAMINATION   Vital signs  Temp: 97.9  F (36.6  C) Temp src: Oral BP: 114/80 Pulse: 78   Resp: 16 SpO2: 95 % O2 Device: Nasal cannula Oxygen Delivery: 2 LPM Height: 172.7 cm (5' 8\") Weight: 70.4 kg (155 lb 1.6 oz)  Estimated body mass index is 23.58 kg/m  as calculated from the following:    Height as of this encounter: 1.727 m (5' 8\").    Weight as of this encounter: 70.4 kg (155 lb 1.6 oz).  I/O last 3 completed shifts:  In: 200 [P.O.:200]  Out: 775 [Urine:775]    CONSTITUTIONAL/GENERAL: Alert male. No apparent distress.  EARS,NOSE,THROAT,MOUTH: External ears and nose overall normal. NC in place.   RESPIRATORY: Diffuse bilateral wheezing.  CARDIOVASCULAR: RRR, S1, S2.   PSYCHIATRIC: Appropriate mood and affect.     LABORATORY ASSESSMENT    Arterial Blood Gas  Recent Labs   Lab 06/02/23  0152   PH 7.21*     CBC  Recent Labs   Lab 06/05/23  0736 06/04/23  0601 06/02/23  0144   WBC 7.0 12.0* 8.3   RBC 5.31 5.16 5.44   HGB 15.3 14.8 15.5   HCT 46.2 45.4 47.2   MCV 87 88 87   MCH 28.8 28.7 28.5   MCHC 33.1 32.6 32.8   RDW 13.2 13.0 13.2    204 187     BMP  Recent Labs   Lab 06/05/23  0736 06/04/23  0601 06/02/23  1655 06/02/23  0611 06/02/23  0527 06/02/23  0144    140  --   --   --  142   POTASSIUM 3.5 4.3  --   --   --  3.9   CHLORIDE 105 105  --   --   --  102   TOBIAS 8.5* 8.6*  --   --   --  9.2   CO2 28 26  --   --   --  27   BUN 14.4 11.8  --   --   --  6.6*   CR 0.67 0.60*  --   --   --  0.59*   GLC 87 131* 159* 136*   < > 140*    < > = values in this interval not displayed.     INRNo lab results found in last 7 days.   BNPNo lab results found in last 7 days.  VENOUS BLOOD GASES  Recent Labs   Lab 06/02/23  0152   HCO3V 29*       IMAGING, ECHO, PFT, SLEEP STUDY, RHC, OTHER TESTING "         ASSESSMENT / PLAN      Pulmonary diagnoses (alphabetical):  COPD w/exacerb J44.1  Hypoxemia R09.02  Pulmonary Nodule - Solitary R91.1  Respiratory Failure Acute J96.00  Shortness of Breath R06.02  Tobacco Dependence in Remission F17.201        ASSESSMENT:  72-year-old male former 100+ pack year smoker (quit 2005), with severe COPD, GERD, pulmonary nodules presenting with dyspnea.     Follows with [facility], last seen 4/2023 with PFTs showing severe obstruction (FEV1 38%), air trapping (%, %) and mild diffusion impairment (DLCO 62%).  Maintained on Trelegy.  LDCT 5/2023 showed severe emphysema, 6 mm RUL nodule, 4 mm lingular nodule, scattered tree-in-bud opacities.      Was recently camping in [place] and was around multiple cats to which she is allergic.  On admission was in hypercapnic and hypoxic respiratory failure requiring BiPAP.  BNP normal, mild leukocytosis.  CXR unremarkable. CT chest shows mod-severe emphysema, lower lobe mucus plugging, mild bibasilar nodular infiltrates, no LAD.     Presentation is consistent with COPD exacerbation likely secondary to allergic trigger during recent travel.  Continues to have diffuse wheezing        PLAN:     Increase Solu-Medrol to 62.5 mg Q12h. Ordered.     Start Anoro inhaler for long-acting LABA/LAMA. Ordered.     Decrease Mucomyst to Q6h. Ordered.     Continue DuoNebs Q4h and Budesnone neb Q12h.    Would eventually discharge with Anoro inhaler and DuoNebs PRN.    Azithromycin x5 days.     Walking oximetry prior to discharge.     Wean O2 to goal SpO2 >88%.     Follow up with Pulmonologist at [place].        Benito Hdez MD    [facility]  Office: [phone]  Pager: [phone]

## 2023-06-06 NOTE — CONSULTS
[facility] Pulmonary Disease Specialist Consult   COPD Initial Interview    2023    Patient: Michele Gan      :  1950                    MRN:[id number]      Reason for Consult: Consulted to provide COPD education and optimize treatment regimen. Patient with severe COPD being followed by COPD Readmission Reduction Program    History of Present Illness: Michele Gan is a 72 year old male admitted on 2023. He presents to the emergency department for evaluation of 2 days shortness of breath in the setting of underlying severe COPD.  Admitted for COPD exacerbation    Smoking Hx: Patient has 80 year pack history, quit in . Smoked 2 ppd for 40 years                   Pulmonologist/Last office visit: Patient last saw Dr. Eulalio Polk in [place] on 23 and also saw Dr. Gorge Gamboa at the [facility] on 23.   -Dr. Gamboa had recommended daily use of Azithromycin 250mg.        Most recent  PFT/interpretation on: 22               Pre    Post  FVC 2.43/61% 3.13/79%   FEV1 0.80/27% 1.15/45%   FEV1/FVC   (Ratio)   43%   48%   DLCO Mildly reduced    Interpretation Very severe obstruction    *Significant bronchodilator response.     Sleep Studies: None     Home Oxygen Use:  Patient was given Home oxygen concentrator by friend and purchased portable oxygen concentrator off [employer]. Explained to patient the importance of knowing the exact amount of O2 needed as O2 is considered a drug and there must be a prescription.        Pulmonary Rehab History: None       Home respiratory medications include:      -Albuterol (Ventolin)  Albuterol/Ipratropium (Duoneb)  Budesonide (Pulmicort 0.25mg BID)    Assessment: Patient with sats of 96% on 1L NC. Took patient off O2 for consult. Sats dropped to 90% briefly but stayed between 91%-93%, HR 72, Coarse non-productive cough, expiratory wheezes throughout. /80.    Patient states he doesn't feel like his COPD is under control and has felt this way for the last 6  months.    He is unable to walk anywhere without significant shortness of breath.    He states he would like to be more active such as daily walks with his friend who has a house on several local walking trails.    He dreams of one day being able to ride a bicycle on paved flat trails       Patient states he stopped Trelegy Ellipta and daily Azithromycin because of cost    He stated that prior to admission he was using his albuterol inhaler up to 20 times per day.     He states that he sometimes feels his chest is full of mucus but he cannot bring it up.     Action:         Evaluated patients inspiratory flow using In-Check device:       Low resistance setting:         **Patient able to generate >120LPM,         which is excessive inspiratory flow for an Albuterol rescue inhaler.         Albuterol inhalers require a slow inhalation of         20-60 LPM for optimal drug disposition with 5-10 second breath hold.        Medium/low resistance setting:          Patient able to generate 50LPM,          which is sufficient inspiratory flow for an Ellipta product DPI.          Medium resistance inhalers require a fast and deep inhalation of          30-80LPM with 5-10 second breath hold.          Evaluated patients' coordination and technique with inhaler:         Patient demonstrated good technique with his inhalers.        Educated patient on proper inspiratory flows needed for all his inhalers.         Provided and instructed patient on proper use of Aerochamber spacer with inhaler.        Reiterated that a spacer should always be used with his rescue inhaler.         Patient able to generate a pressure of 10  cm H2O on Aerobika OPEP device for 2-3 seconds generating good strong non-productive cough. The goal for each breath, for a total of 3 sets of 10 breaths, is to exhale at a of pressure 10-20 for 3-4 seconds without fatigue. Educated patient to perform 2 to 3 'lombardi coughs'  to clear airway after each set.          "Shared that consistent use of this device will help open smaller airways, improve mucus clearance, decrease cough frequency, and improve exercise tolerance.     Recommendations:    Continue with current inpatient respiratory medication schedule.       Overnight oximetry 24-48 hours prior to discharge to assess for nocturnal hypoxia      Will need follow up appointment with Pulmonologist.-Patient will call to make appointment with Dr. Gamboa       Use Aerobika Oscillating PEP Device for 3 sets of 10 breaths two times daily as directed above      Test claim for Trelegy and Anoro to assess affordibility. If affordable, would recommend reordering the Trelegy 200/65.5/25 (Asthma dose)      Unclear why patient is prescribed only 0.25mg of pulmicort BID given his significant bronchodilator response. If pulmonary agrees, consider 0.5, or 1mg BID of pulmicort      OP Allergy testing to assess known triggers for RAD      If pulmonary consult agrees and patient can afford this, please consider daily Azithromycin again.       Referral for outpatient pulmonary Rehab-Patient very open to this.      Home Oxygen Assessment Testing 24-48 hours prior to discharge to determine O2 needs at rest and with exertion/activity. If the patient requires oxygen at rest, the walk test must be performed using the new baseline O2 to determine if patient needs more oxygen with activity.       In the event patient qualifies for oxygen, at rest or with activity, please use the following verbiage in oxygen order: \"Please provide portable oxygen concentrator AND please test for oxygen conserving device using ____LPM to maintain sats between ____)      Patient is a good candidate for COPD Action Plan-will give patient paper copy with instructions not to wait if symptoms of exacerbation persist.      I spent 60 minutes with the patient.    -Will continue to follow patient, assist bedside RT, RN CC, SW, and treatment team with specific respiratory " discharge needs and IP recommendations.       Gabby Thayer, RRT, CTTS  Chronic Pulmonary Disease Specialist  Office: [phone]  Pager: [phone]  Hours: ZIGGY 8-4:30

## 2023-06-06 NOTE — PROGRESS NOTES
[facility]    Medicine Progress Note - Hospitalist Service    Date of Admission:  6/2/2023    Assessment & Plan   Michele Gan is a 72 year old male admitted on 6/2/2023. He presents to the emergency department for evaluation of 2 days shortness of breath in the setting of underlying severe COPD. Admitted for likely COPD exacerbation     Acute exacerbation of COPD  Acute on chronic hypoxic hypercarbic respiratory failure: Improved  Severe chronic obstructive lung disease:   Pt reports that he purchased a home oxygen concentrator and has been using around 3 L at night;  Bronchitic phenotype COPD with FEV1 of 38%, residual volume of 175%, DLCO 62%.  Severe emphysematous changes by imaging with scattered bronchial thickening.  Follows with pulmonary through [facility].  Negative COVID, RSV, and influenza. procalcitonin 0.08  -Has been on intermittent BiPAP, now weaned off to 2 L of nasal cannula at this time.  -He is currently on a scheduled DuoNebs 4 times daily, as needed albuterol nebulizers available, Pulmicort nebulization twice daily and IV Solu-Medrol 40 mg twice daily.    -IV azithromycin 500 mg daily for course of treatment, oximetry, oxygen as needed  -Received Solu-Medrol 125 mg in the ED -> was IV Solu-Medrol 62.5 mg twice daily, and then switch to oral prednisone with worsening respiratory ration and had RRT on 6/5/2023 and the pulmonary was consulted and CT chest PE protocol was done.  CT chest PE protocol reviewed, no pulm embolism, does shows moderate to severe centrilobular emphysema, there is mucus plugging in the lower lobe bronchi with mild peribronchial reticulonodular infiltrates in both lungs.  Agree with Pulmicort nebulization and DuoNeb nebulization, I will increase DuoNeb to every 4 hour.  I will start him on Mucomyst nebulization, continue I-S but add flutter valve.  I will like to continue weaning oxygen and wean him to room air if he can.       Pulmonary nodules:    As of May 2 2023 CT through [facility] system, was noted to have a growing right upper lobe nodule measuring 6 mm as well as a new 4 mm lingula nodular finding.  In addition, severe emphysema, scattered bronchial wall thickening, and numerous new scattered sub-4 mm tree-in-bud opacities in bilateral lower and middle lobes.  -HIV negative in April 2022, though syphilis antibodies positive; RPR negative.  Followed by ID service at [place] and treated with PCN by chart review.  -Patient is aware of plan for follow-up CT imaging in 3 months  -CT chest reviewed, no pulm embolism, mucous plugging, no mention of any pulmonary lymph node, no lymphadenopathy there are calcified right hilar lymph node compatible with old ligamentous disease.     Petechiae, purpura:   Patient with several nontender, nonblanching purpuric lesions involving his forearms bilaterally.  New findings for him over the past weeks and associated with multiple episodes of minor trauma.  On aspirin, though no blood thinners.  Normal platelet count, normal LFTs,  Mild eosinophilia and the fact that patient's purpura were exacerbated by small injuries are reassuring that this is not a primary vasculitis, ANCA negative, eosinophilia resolved.  Unlikely any vasculitis at this time.        Elevated troponin: Likely demand ischemia  -Increased to  95 on admission, likely demand ischemia, serial troponins are negative.  No active chest pain  Echocardiogram is ordered and is pending at this time.. f WMA then will get inpatient cardiology consult      Diet: Advance Diet as Tolerated: Regular Diet Adult    DVT Prophylaxis: Pneumatic Compression Devices  Albarran Catheter: Not present  Lines: None     Cardiac Monitoring: None  Code Status: Full Code      Clinically Significant Risk Factors                                  Disposition Plan      Expected Discharge Date: 06/07/2023,  3:00 PM                Dave Bonds MD  Hospitalist Service  [facility]  "[facility]  Securely message with [employer] (more info)  Text page via [facility] Paging/Directory   ______________________________________________________________________    Interval History   Patient seen and evaluated this morning, told me that his nebulizer is not working, he was short of breath when he woke up this morning.  After nebulization his breathing is improved.  No fever chills chest pain headache dizziness lightheadedness at this time.  He think he is congested and cannot cough up.    Physical Exam   /80 (BP Location: Left arm)   Pulse 78   Temp 97.9  F (36.6  C) (Oral)   Resp 16   Ht 1.727 m (5' 8\")   Wt 70.4 kg (155 lb 1.6 oz)   SpO2 94%   BMI 23.58 kg/m    Gen- pleasant   HEENT- NAD, SHARON  Neck- supple  CVS- I+II+ no m/r/g  RS-have bilateral diffuse wheezing, no crackles.  Good air entry bilaterally.  Abdo- soft, no tenderness . No g/r/r  Ext- no edema       Medical Decision Making       55 MINUTES SPENT BY ME on the date of service doing chart review, history, exam, documentation & further activities per the note.      Data   ------------------------- PAST 24 HR DATA REVIEWED -----------------------------------------------    I have personally reviewed the following data over the past 24 hrs:    8.9  \   15.8   / 222     141 103 14.0 /  131 (H)   3.8 24 0.58 (L) \       Trop: N/A BNP: N/A       Imaging results reviewed over the past 24 hrs:   Recent Results (from the past 24 hour(s))   CT Chest Pulmonary Embolism w Contrast    Narrative    CT CHEST PULMONARY EMBOLISM WITH CONTRAST 6/5/2023 12:37 PM    CLINICAL HISTORY: Shortness of breath. Chest pain. Rule out pulmonary  embolism    TECHNIQUE: CT angiogram chest during arterial phase injection IV  contrast. 2D and 3D MIP reconstructions were performed by the CT  technologist. Dose reduction techniques were used.     CONTRAST:  61 mL isovue 370    COMPARISON: None.    FINDINGS:  ANGIOGRAM CHEST: Pulmonary arteries are normal caliber " and negative  for pulmonary emboli. Thoracic aorta is negative for dissection. No CT  evidence of right heart strain.    LUNGS AND PLEURA: Moderate to severe centrilobular emphysema.  Calcified granuloma right upper lobe. There is mucous plugging in the  lower lobe bronchi with mild peribronchial reticular nodular  infiltrates in both lower lobes, left greater than right.    MEDIASTINUM/AXILLAE: No lymphadenopathy. There are calcified right  hilar lymph nodes compatible with old granulomatous disease.    UPPER ABDOMEN: Normal.    MUSCULOSKELETAL: Normal.      Impression    IMPRESSION:  1.  No evidence of pulmonary embolus.  2.  Mucous plugging with mild peribronchial infiltrates in the lower  lobes bilaterally suggesting mild pneumonitis, possibly aspiration  pneumonitis.  3.  Moderate to severe emphysema.    KENRICK LUQUE MD         SYSTEM ID:  [id number]

## 2023-06-06 NOTE — PLAN OF CARE
Goal Outcome Evaluation:  Summary: COPD exacerbation   DATE & TIME: 6/6/2023- [id number]  Cognitive Concerns/ Orientation: A&Ox4   BEHAVIOR & AGGRESSION TOOL COLOR: Green  CIWA SCORE: N/A   ABNL VS/O2:  VSS on RA, ex HTN/tachy at times  MOBILITY: Independent, calls appropriately; up in chair most of shift, ambulates to bathroom, ambulated in hallway X2, weaning O2  PAIN MANAGMENT: Denies pain  DIET: Regular-poor appetite. Denies N/V.   BOWEL/BLADDER: Continent  ABNL LAB/BG:   DRAIN/DEVICES: 2x PIV S/L; intermittent IV abx  TELEMETRY RHYTHM: N/A  SKIN: Scattered bruises, petechia on LE + scratch on R arm.  TESTS/PROCEDURES: ECHO  done.  Discharge pending progress  OTHER IMPORTANT INFO: LS diminished with expiratory wheezing as that bases. Ambulated around nurses station on RA sats remained above 94%. Receiving nebs, RT following.CT chest done.Pulmonology following.

## 2023-06-07 VITALS
TEMPERATURE: 98.2 F | WEIGHT: 155.1 LBS | SYSTOLIC BLOOD PRESSURE: 125 MMHG | OXYGEN SATURATION: 91 % | BODY MASS INDEX: 23.51 KG/M2 | HEIGHT: 68 IN | HEART RATE: 71 BPM | DIASTOLIC BLOOD PRESSURE: 84 MMHG | RESPIRATION RATE: 18 BRPM

## 2023-06-07 LAB
ANION GAP SERPL CALCULATED.3IONS-SCNC: 11 MMOL/L (ref 7–15)
ATRIAL RATE - MUSE: 65 BPM
BASOPHILS # BLD AUTO: 0 10E3/UL (ref 0–0.2)
BASOPHILS NFR BLD AUTO: 0 %
BUN SERPL-MCNC: 14.8 MG/DL (ref 8–23)
CALCIUM SERPL-MCNC: 9.1 MG/DL (ref 8.8–10.2)
CHLORIDE SERPL-SCNC: 103 MMOL/L (ref 98–107)
CREAT SERPL-MCNC: 0.59 MG/DL (ref 0.67–1.17)
DEPRECATED HCO3 PLAS-SCNC: 25 MMOL/L (ref 22–29)
DIASTOLIC BLOOD PRESSURE - MUSE: NORMAL MMHG
EOSINOPHIL # BLD AUTO: 0 10E3/UL (ref 0–0.7)
EOSINOPHIL NFR BLD AUTO: 0 %
ERYTHROCYTE [DISTWIDTH] IN BLOOD BY AUTOMATED COUNT: 13.1 % (ref 10–15)
GFR SERPL CREATININE-BSD FRML MDRD: >90 ML/MIN/1.73M2
GLUCOSE SERPL-MCNC: 120 MG/DL (ref 70–99)
HCT VFR BLD AUTO: 48.4 % (ref 40–53)
HGB BLD-MCNC: 16.3 G/DL (ref 13.3–17.7)
IMM GRANULOCYTES # BLD: 0 10E3/UL
IMM GRANULOCYTES NFR BLD: 1 %
INTERPRETATION ECG - MUSE: NORMAL
LYMPHOCYTES # BLD AUTO: 0.9 10E3/UL (ref 0.8–5.3)
LYMPHOCYTES NFR BLD AUTO: 10 %
MCH RBC QN AUTO: 28.4 PG (ref 26.5–33)
MCHC RBC AUTO-ENTMCNC: 33.7 G/DL (ref 31.5–36.5)
MCV RBC AUTO: 85 FL (ref 78–100)
MONOCYTES # BLD AUTO: 0.4 10E3/UL (ref 0–1.3)
MONOCYTES NFR BLD AUTO: 5 %
NEUTROPHILS # BLD AUTO: 7.4 10E3/UL (ref 1.6–8.3)
NEUTROPHILS NFR BLD AUTO: 84 %
NRBC # BLD AUTO: 0 10E3/UL
NRBC BLD AUTO-RTO: 0 /100
P AXIS - MUSE: 53 DEGREES
PLATELET # BLD AUTO: 239 10E3/UL (ref 150–450)
POTASSIUM SERPL-SCNC: 4.1 MMOL/L (ref 3.4–5.3)
PR INTERVAL - MUSE: 142 MS
QRS DURATION - MUSE: 92 MS
QT - MUSE: 470 MS
QTC - MUSE: 488 MS
R AXIS - MUSE: -6 DEGREES
RBC # BLD AUTO: 5.73 10E6/UL (ref 4.4–5.9)
SODIUM SERPL-SCNC: 139 MMOL/L (ref 136–145)
SYSTOLIC BLOOD PRESSURE - MUSE: NORMAL MMHG
T AXIS - MUSE: 3 DEGREES
VENTRICULAR RATE- MUSE: 65 BPM
WBC # BLD AUTO: 8.7 10E3/UL (ref 4–11)

## 2023-06-07 PROCEDURE — 36415 COLL VENOUS BLD VENIPUNCTURE: CPT | Performed by: HOSPITALIST

## 2023-06-07 PROCEDURE — 80048 BASIC METABOLIC PNL TOTAL CA: CPT | Performed by: HOSPITALIST

## 2023-06-07 PROCEDURE — 99239 HOSP IP/OBS DSCHRG MGMT >30: CPT | Performed by: INTERNAL MEDICINE

## 2023-06-07 PROCEDURE — 999N000157 HC STATISTIC RCP TIME EA 10 MIN

## 2023-06-07 PROCEDURE — 85025 COMPLETE CBC W/AUTO DIFF WBC: CPT | Performed by: HOSPITALIST

## 2023-06-07 PROCEDURE — 94640 AIRWAY INHALATION TREATMENT: CPT

## 2023-06-07 PROCEDURE — 250N000011 HC RX IP 250 OP 636: Performed by: INTERNAL MEDICINE

## 2023-06-07 PROCEDURE — 94762 N-INVAS EAR/PLS OXIMTRY CONT: CPT

## 2023-06-07 PROCEDURE — 250N000009 HC RX 250: Performed by: INTERNAL MEDICINE

## 2023-06-07 RX ORDER — PREDNISONE 10 MG/1
TABLET ORAL
Qty: 30 TABLET | Refills: 0 | Status: SHIPPED | OUTPATIENT
Start: 2023-06-07

## 2023-06-07 RX ADMIN — IPRATROPIUM BROMIDE AND ALBUTEROL SULFATE 3 ML: .5; 3 SOLUTION RESPIRATORY (INHALATION) at 08:19

## 2023-06-07 RX ADMIN — BUDESONIDE 0.5 MG: 0.5 INHALANT RESPIRATORY (INHALATION) at 08:20

## 2023-06-07 RX ADMIN — ACETYLCYSTEINE 2 ML: 200 INHALANT RESPIRATORY (INHALATION) at 08:20

## 2023-06-07 RX ADMIN — AZITHROMYCIN MONOHYDRATE 500 MG: 500 INJECTION, POWDER, LYOPHILIZED, FOR SOLUTION INTRAVENOUS at 06:36

## 2023-06-07 RX ADMIN — UMECLIDINIUM BROMIDE AND VILANTEROL TRIFENATATE 1 PUFF: 62.5; 25 POWDER RESPIRATORY (INHALATION) at 07:44

## 2023-06-07 RX ADMIN — METHYLPREDNISOLONE SODIUM SUCCINATE 62.5 MG: 125 INJECTION, POWDER, FOR SOLUTION INTRAMUSCULAR; INTRAVENOUS at 07:44

## 2023-06-07 ASSESSMENT — ACTIVITIES OF DAILY LIVING (ADL)
ADLS_ACUITY_SCORE: 24

## 2023-06-07 NOTE — DISCHARGE SUMMARY
[facility]    Discharge Summary  Hospitalist    Date of Admission:  6/2/2023  Date of Discharge:  6/7/2023 12:20 PM  Discharging Provider: Dave Bonds MD, MD  Date of Service (when I saw the patient): 06/07/23    Discharge Diagnoses   Please refer below     History of Present Illness   Michele Gan is an 72 year old male who presented with shortness of breath    Hospital Course   Michele Gan is a 72 year old male admitted on 6/2/2023. He presents to the emergency department for evaluation of 2 days shortness of breath in the setting of underlying severe COPD. Admitted for COPD exacerbation    Final discharge diagnoses and hospital course     Acute exacerbation of COPD  Acute on chronic hypoxic hypercarbic respiratory failure:  Resolved  Severe chronic obstructive lung disease:   Pt reports that he purchased a home oxygen concentrator and has been using around 3 L at night;  Bronchitic phenotype COPD with FEV1 of 38%, residual volume of 175%, DLCO 62%.  Severe emphysematous changes by imaging with scattered bronchial thickening.  Follows with pulmonary through [facility].  Negative COVID, RSV, and influenza. procalcitonin 0.08  -Has was intermittent BiPAP, now weaned off to 2 oxygen 2 L initially with nasal cannula into the room air then  -He was on a scheduled DuoNebs 4 times daily, as needed albuterol nebulizers available, Pulmicort nebulization twice daily and IV Solu-Medrol 40 mg twice daily.     -IV azithromycin 500 mg daily for course of treatment, oximetry, oxygen as needed  -Received Solu-Medrol 125 mg in the ED -> was IV Solu-Medrol 62.5 mg twice daily, and then switch to oral prednisone with worsening respiratory ration and had RRT on 6/5/2023 and the pulmonary was consulted and CT chest PE protocol was done.  CT chest PE protocol reviewed, no pulm embolism, does shows moderate to severe centrilobular emphysema, there is mucus plugging in the lower lobe bronchi with mild  peribronchial reticulonodular infiltrates in both lungs.  Agree with Pulmicort nebulization and DuoNeb nebulization, I did increase his DuoNeb nebulization every 4 hours, added Mucomyst nebulization 4 times a day start him on aggressive pulmonary toilet with I-S and flutter.    He responded very well to the treatment, and was able to wean him off from oxygen to room air.  At the time of discharge he has no wheezing no crackles no shortness of breath no cough.  We did overnight desaturation study, during the time he did desaturate to less than 88% for total 4 minutes.  He is prescribed nocturnal oxygen 1 to 2 L to use at night.  Walking desaturation study was negative, 6-minute walk test she remained 90% or above.    He will be discharged on tapering doses of prednisone, Anoro Ellipta and DuoNeb nebulization.  We will use Pulmicort nebulization as needed as he was using before.  Pulmonary rehab outpatient.  He will need to follow-up with his pulmonologist and primary care physician in 1 week.  He already completed 6 days of IV azithromycin no need for further antibiotics.  Discharged home in stable improved condition.        Pulmonary nodules:   As of May 2 2023 CT through [facility] system, was noted to have a growing right upper lobe nodule measuring 6 mm as well as a new 4 mm lingula nodular finding.  In addition, severe emphysema, scattered bronchial wall thickening, and numerous new scattered sub-4 mm tree-in-bud opacities in bilateral lower and middle lobes.  -HIV negative in April 2022, though syphilis antibodies positive; RPR negative.  Followed by ID service at [facility] and treated with PCN by chart review.  -Patient is aware of plan for follow-up CT imaging in 3 months  -CT chest reviewed, no pulm embolism, mucous plugging, no mention of any pulmonary lymph node, no lymphadenopathy there are calcified right hilar lymph node compatible with old ligamentous disease.     Petechiae, purpura: Improved or  resolved   Patient with several nontender, nonblanching purpuric lesions involving his forearms bilaterally.  New findings for him over the past weeks and associated with multiple episodes of minor trauma.  On aspirin, though no blood thinners.  Normal platelet count, normal LFTs,  Mild eosinophilia and the fact that patient's purpura were exacerbated by small injuries are reassuring that this is not a primary vasculitis, ANCA negative, eosinophilia resolved.  Unlikely any vasculitis at this time.         Elevated troponin: Likely demand ischemia  -Increased to  95 on admission, likely demand ischemia, serial troponins are negative.  No active chest pain  Echocardiogram is ordered and is pending at this time.. f WMA then will get inpatient cardiology consult               Dave Bonds MD, MD    Significant Results and Procedures       Pending Results   These results will be followed up by PCP  Unresulted Labs Ordered in the Past 30 Days of this Admission     No orders found from 5/3/2023 to 6/3/2023.          Code Status   Full Code       Primary Care Physician   Physician No Ref-Primary    Physical Exam   Temp: 98.2  F (36.8  C) Temp src: Oral BP: 125/84 Pulse: 71   Resp: 18 SpO2: 91 % O2 Device: None (Room air) Oxygen Delivery: 1 LPM  Vitals:    06/04/23 0535 06/04/23 1322 06/05/23 0535   Weight: 71.7 kg (158 lb) 70.5 kg (155 lb 6.8 oz) 70.4 kg (155 lb 1.6 oz)     Vital Signs with Ranges  Temp:  [97.8  F (36.6  C)-98.2  F (36.8  C)] 98.2  F (36.8  C)  Pulse:  [68-74] 71  Resp:  [16-18] 18  BP: (117-125)/(77-84) 125/84  SpO2:  [91 %-95 %] 91 %  I/O last 3 completed shifts:  In: 480 [P.O.:480]  Out: 350 [Urine:350]    Constitutional: awake, alert, cooperative, no apparent distress, and appears stated age  Eyes: Lids and lashes normal, pupils equal, round and reactive to light, extra ocular muscles intact, sclera clear, conjunctiva normal  Respiratory: No increased work of breathing, good air exchange, clear to  auscultation bilaterally, no crackles or wheezing  Cardiovascular: Normal apical impulse, regular rate and rhythm, normal S1 and S2, no S3 or S4, and no murmur noted  GI: No scars, normal bowel sounds, soft, non-distended, non-tender, no masses palpated, no hepatosplenomegally  Musculoskeletal: no lower extremity pitting edema present  Neurologic: No focal deficit    Discharge Disposition   Discharged to home  Condition at discharge: Stable    Consultations This Hospital Stay   ADVANCE DIRECTIVE IP CONSULT  PULMONARY IP CONSULT  IP RESPIRATORY CARE CHRONIC PULMONARY DISEASE SPECIALIST  PHARMACY LIAISON FOR MEDICATION COVERAGE CONSULT    Time Spent on this Encounter   I, Dave Bonds MD, personally saw the patient today and spent greater than 30 minutes discharging this patient.    Discharge Orders      Pulmonary Rehab Referral      Reason for your hospital stay    Acute  exacerbation of COPD     Follow-up and recommended labs and tests     Follow up with primary care provider, Physician No Ref-Primary, within 7 days for hospital follow- up.  No follow up labs or test are needed.     Activity    Your activity upon discharge: activity as tolerated     Oxygen Adult/Peds    Oxygen Documentation  I certify that this patient, Michele Gan has been under my care (or a nurse practitioner or physican's assistant working with me). This is the face-to-face encounter for oxygen medical necessity.      At the time of this encounter supplemental oxygen is reasonable and necessary and is expected to improve the patient's condition in a home setting.   He will need nocturnal oxygen as he desaturated to <88% for 4 min on overnight desaturation study     Patient has continued oxygen desaturation due to Chronic Bronchitis J41.0  COPD J44.9  Emphysema J43.9.    If portability is ordered, is the patient mobile within the home? yes     Diet    Follow this diet upon discharge: Orders Placed This Encounter      Advance Diet as Tolerated:  Regular Diet Adult     Discharge Medications   Discharge Medication List as of 6/7/2023 12:03 PM      START taking these medications    Details   predniSONE (DELTASONE) 10 MG tablet 4 tabs daily for 3 days, then 3 tabs daily for 3 days, then 2 tabs daily for 3 days, then 1 tab daily for 3 days, then stop, Disp-30 tablet, R-0, E-Prescribe      umeclidinium-vilanterol (ANORO ELLIPTA) 62.5-25 MCG/ACT oral inhaler Inhale 1 puff into the lungs daily, Disp-1 each, R-0, E-Prescribe         CONTINUE these medications which have NOT CHANGED    Details   albuterol (PROAIR HFA/PROVENTIL HFA/VENTOLIN HFA) 108 (90 Base) MCG/ACT inhaler Inhale 1-2 puffs into the lungs every 6 hours as needed for shortness of breath, wheezing or cough, Historical      budesonide (PULMICORT) 0.25 MG/2ML neb solution Take 0.25 mg by nebulization 2 times daily as needed, Historical      citalopram (CELEXA) 40 MG tablet Take 40 mg by mouth At Bedtime, Historical      ipratropium - albuterol 0.5 mg/2.5 mg/3 mL (DUONEB) 0.5-2.5 (3) MG/3ML neb solution Take 1 vial by nebulization every 6 hours as needed for shortness of breath, wheezing or cough, Historical      tamsulosin (FLOMAX) 0.4 MG capsule Take 0.4 mg by mouth At Bedtime, Historical      zolpidem (AMBIEN) 5 MG tablet Take 5 mg by mouth At Bedtime, Historical           Allergies   Not on File  Data   Most Recent 3 CBC's:Recent Labs   Lab Test 06/07/23  0836 06/06/23  1025 06/05/23  0736   WBC 8.7 8.9 7.0   HGB 16.3 15.8 15.3   MCV 85 85 87    222 185      Most Recent 3 BMP's:  Recent Labs   Lab Test 06/07/23  0836 06/06/23  1025 06/05/23  0736    141 141   POTASSIUM 4.1 3.8 3.5   CHLORIDE 103 103 105   CO2 25 24 28   BUN 14.8 14.0 14.4   CR 0.59* 0.58* 0.67   ANIONGAP 11 14 8   TOBIAS 9.1 9.2 8.5*   * 131* 87     Most Recent 2 LFT's:  Recent Labs   Lab Test 06/02/23  0144   AST 25   ALT 15   ALKPHOS 60   BILITOTAL 0.6     Most Recent INR's and Anticoagulation Dosing  History:  Anticoagulation Dose History          View : No data to display.                    Most Recent 3 Troponin's:No lab results found.  Most Recent Cholesterol Panel:No lab results found.  Most Recent 6 Bacteria Isolates From Any Culture (See EPIC Reports for Culture Details):No lab results found.  Most Recent TSH, T4 and A1c Labs:No lab results found.  Results for orders placed or performed during the hospital encounter of 06/02/23   XR Chest Port 1 View    Narrative    EXAM: XR CHEST PORT 1 VIEW  LOCATION: [facility]  DATE/TIME: 6/2/2023 1:53 AM CDT    INDICATION: SOB  COMPARISON: None.      Impression    IMPRESSION: EKG wires and leads projecting over the chest obscures some details.  Heart size and pulmonary vascularity within normal limits. Scattered interstitial prominence throughout the mid and lower lungs likely representing chronic lung markings.   No focal lung infiltrates. Osseous structures grossly intact. Visualized upper abdomen unremarkable.   XR Chest Port 1 View    Narrative    XR CHEST PORT 1 VIEW 6/5/2023 7:12 AM    HISTORY: Chest pain    COMPARISON: Chest x-ray dated 6/2/2023.      Impression    IMPRESSION: EKG wires overlie the chest. Otherwise negative chest.    TONI JARAMILLO MD         SYSTEM ID:  [id number]   CT Chest Pulmonary Embolism w Contrast    Narrative    CT CHEST PULMONARY EMBOLISM WITH CONTRAST 6/5/2023 12:37 PM    CLINICAL HISTORY: Shortness of breath. Chest pain. Rule out pulmonary  embolism    TECHNIQUE: CT angiogram chest during arterial phase injection IV  contrast. 2D and 3D MIP reconstructions were performed by the CT  technologist. Dose reduction techniques were used.     CONTRAST:  61 mL isovue 370    COMPARISON: None.    FINDINGS:  ANGIOGRAM CHEST: Pulmonary arteries are normal caliber and negative  for pulmonary emboli. Thoracic aorta is negative for dissection. No CT  evidence of right heart strain.    LUNGS AND PLEURA: Moderate to severe  centrilobular emphysema.  Calcified granuloma right upper lobe. There is mucous plugging in the  lower lobe bronchi with mild peribronchial reticular nodular  infiltrates in both lower lobes, left greater than right.    MEDIASTINUM/AXILLAE: No lymphadenopathy. There are calcified right  hilar lymph nodes compatible with old granulomatous disease.    UPPER ABDOMEN: Normal.    MUSCULOSKELETAL: Normal.      Impression    IMPRESSION:  1.  No evidence of pulmonary embolus.  2.  Mucous plugging with mild peribronchial infiltrates in the lower  lobes bilaterally suggesting mild pneumonitis, possibly aspiration  pneumonitis.  3.  Moderate to severe emphysema.    KENRICK LUQUE MD         SYSTEM ID:  [id number]   Echocardiogram Complete     Value    LVEF  50-55%    Narrative    [id number]  [id number]  [id number]  [id number]^CONSUELO^LONG     [facility]  Echocardiography Laboratory  [address] [place] [address]     Name: AKUA MANCUSO  MRN: [id number]  : 1950  Study Date: 2023 01:10 PM  Age: 72 yrs  Gender: Male  Patient Location: [facility]  Reason For Study: SOB  Ordering Physician: LONG CORDERO  Referring Physician: LONG CORDERO  Performed By: Carey Da Silva     BSA: 1.8 m2  Height: 68 in  Weight: 155 lb  HR: 77  ______________________________________________________________________________  Procedure  Complete Echo Adult. Optison (NDC #[id number]) given intravenously.  ______________________________________________________________________________  Interpretation Summary     Technically challenging study due to patient body habitus, even with the use  of contrast imaging.     Left ventricular systolic function is borderline reduced. The visual ejection  fraction is 50-55%. Mildly flattened septum suggesting mildly elevated right-  sided pressures.  Right ventricular global function is normal.  The aortic valve is trileaflet with aortic valve sclerosis. There is trace  aortic  regurgitation.  There is no pericardial effusion.     There are no prior studies available for comparison.  ______________________________________________________________________________  Left Ventricle  The left ventricle is normal in size. There is normal left ventricular wall  thickness. Left ventricular systolic function is borderline reduced. The  visual ejection fraction is 50-55%. Left ventricular diastolic function is  normal. Mildly flattened septum suggesting mildly elevated right-sided  pressures.     Right Ventricle  The right ventricle is normal in structure, function and size.     Atria  Normal left atrial size. Right atrial size is normal. There is no color  Doppler evidence of an atrial shunt.     Mitral Valve  The mitral valve is normal in structure and function.     Tricuspid Valve  The tricuspid valve is not well visualized, but is grossly normal. Right  ventricular systolic pressure could not be approximated due to inadequate  tricuspid regurgitation.     Aortic Valve  The aortic valve is trileaflet with aortic valve sclerosis. There is trace  aortic regurgitation.     Pulmonic Valve  The pulmonic valve is not well seen, but is grossly normal.     Vessels  The aortic root is normal size. Normal size ascending aorta. The inferior vena  cava was normal in size with preserved respiratory variability.     Pericardium  There is no pericardial effusion.     ______________________________________________________________________________  MMode/2D Measurements & Calculations  IVSd: 0.66 cm  LVIDd: 4.9 cm  LVIDs: 3.6 cm  LVPWd: 0.65 cm  FS: 25.4 %     LV mass(C)d: 101.3 grams  LV mass(C)dI: 55.3 grams/m2  Ao root diam: 3.7 cm  asc Aorta Diam: 3.5 cm  LVOT diam: 2.1 cm  LVOT area: 3.5 cm2  RWT: 0.27  TAPSE: 2.0 cm     Doppler Measurements & Calculations  MV E max marvel: 65.6 cm/sec  MV A max marvel: 111.0 cm/sec  MV E/A: 0.59  MV dec slope: 261.0 cm/sec2  MV dec time: 0.25 sec  Ao V2 max: 129.0 cm/sec  Ao max  P.0 mmHg  ASHLEY(V,D): 2.3 cm2  LV V1 max P.9 mmHg  LV V1 max: 85.5 cm/sec  LV V1 VTI: 18.1 cm  SV(LVOT): 62.7 ml  SI(LVOT): 34.2 ml/m2  PA V2 max: 107.0 cm/sec  PA max P.6 mmHg  PA acc time: 0.12 sec  AV Alex Ratio (DI): 0.66  Lateral E/e': 5.3     ______________________________________________________________________________  Report approved by: Dipika Gaffney 2023 03:14 PM             Most Recent 3 CBC's:Recent Labs   Lab Test 23  0836 23  1025 23  0736   WBC 8.7 8.9 7.0   HGB 16.3 15.8 15.3   MCV 85 85 87    222 185     Most Recent 3 BMP's:Recent Labs   Lab Test 23  0836 23  1025 23  0736    141 141   POTASSIUM 4.1 3.8 3.5   CHLORIDE 103 103 105   CO2 25 24 28   BUN 14.8 14.0 14.4   CR 0.59* 0.58* 0.67   ANIONGAP 11 14 8   TOBIAS 9.1 9.2 8.5*   * 131* 87     Most Recent 2 LFT's:Recent Labs   Lab Test 23  0144   AST 25   ALT 15   ALKPHOS 60   BILITOTAL 0.6

## 2023-06-07 NOTE — CONSULTS
Patient has Medicare Advantage through [facility].     Patient will pay 100% of the first $480 in drug costs.  $166 deductible remains.    Patient is currently using [facility], which is not a plan preferred pharmacy.  Patient will pay less by using a preferred pharmacy.  Preferred pharmacies (17% coinsurance): [facility], [facility], [facility]  Standard pharmacies (25% coinsurance):  [facility], [facility], [facility]     Initial coverage  Standard/Preferred Coverage gap   LAMA/LABAs     Anoro/Stiolto $110/$74 per month $110/mo        LAMA/LABA/ICS     Trelegy $153/$103 per month $153/mo        Pulmicort 0.25mg or 0.5mg nebs $84/$84 per month $42/mo   Azithromycin 200mg $20/$10 per month $6/mo     Patient will pay lower drug costs by choosing a Medicare D plan for 2024 that has flat copays as instead of coinsurances (percentages).  Plans can be reviewed at medicare.gov October 15-December 7.  The Senior Linkage Line provides free assistance in choosing a plan by calling [phone].    Addendum: Spoke with patient regarding a edouard option, but unfortunately the funds are depleted.  Emailed patient at [email or web address] with other resources.    Floridalma Rosas  Pharmacy Technician/Liaison, Discharge Pharmacy   [phone] (voice or text)  [email or web address]

## 2023-06-07 NOTE — PROGRESS NOTES
"PULMONOLOGY PROGRESS NOTE  [facility]      SUBJECTIVE  / INTERVAL EVENTS     Off supplemental O2.  Feeling significantly improved with additional high-dose steroids.    PHYSICAL EXAMINATION   Vital signs  Temp: 98.2  F (36.8  C) Temp src: Oral BP: 125/84 Pulse: 71   Resp: 18 SpO2: 91 % O2 Device: None (Room air) Oxygen Delivery: 1 LPM Height: 172.7 cm (5' 8\") Weight: 70.4 kg (155 lb 1.6 oz)  Estimated body mass index is 23.58 kg/m  as calculated from the following:    Height as of this encounter: 1.727 m (5' 8\").    Weight as of this encounter: 70.4 kg (155 lb 1.6 oz).  I/O last 3 completed shifts:  In: 480 [P.O.:480]  Out: 350 [Urine:350]    CONSTITUTIONAL/GENERAL: Alert male. No apparent distress.  EARS,NOSE,THROAT,MOUTH: External ears and nose overall normal. NC in place.   RESPIRATORY: Mild wheezing, notably improved.  CARDIOVASCULAR: RRR, S1, S2.   PSYCHIATRIC: Appropriate mood and affect.     LABORATORY ASSESSMENT    Arterial Blood Gas  Recent Labs   Lab 06/02/23  0152   PH 7.21*     CBC  Recent Labs   Lab 06/07/23  0836 06/06/23  1025 06/05/23  0736 06/04/23  0601   WBC 8.7 8.9 7.0 12.0*   RBC 5.73 5.55 5.31 5.16   HGB 16.3 15.8 15.3 14.8   HCT 48.4 47.3 46.2 45.4   MCV 85 85 87 88   MCH 28.4 28.5 28.8 28.7   MCHC 33.7 33.4 33.1 32.6   RDW 13.1 13.2 13.2 13.0    222 185 204     BMP  Recent Labs   Lab 06/07/23  0836 06/06/23  1025 06/05/23  0736 06/04/23  0601    141 141 140   POTASSIUM 4.1 3.8 3.5 4.3   CHLORIDE 103 103 105 105   TOBIAS 9.1 9.2 8.5* 8.6*   CO2 25 24 28 26   BUN 14.8 14.0 14.4 11.8   CR 0.59* 0.58* 0.67 0.60*   * 131* 87 131*     INRNo lab results found in last 7 days.   BNPNo lab results found in last 7 days.  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     Follows with [facility], last seen 4/2023 with PFTs showing severe obstruction (FEV1 38%), air trapping (%, %) and mild diffusion impairment (DLCO 62%).  Maintained on Trelegy.  LDCT 5/2023 showed severe emphysema, 6 mm RUL nodule, 4 mm lingular nodule, scattered tree-in-bud opacities.      Was recently camping in [place] and was around multiple cats to which she is allergic.  On admission was in hypercapnic and hypoxic respiratory failure requiring BiPAP.  BNP normal, mild leukocytosis.  CXR unremarkable. CT chest shows mod-severe emphysema, lower lobe mucus plugging, mild bibasilar nodular infiltrates, no LAD.     Presentation is consistent with COPD exacerbation likely secondary to allergic trigger during recent travel.  Wheezing improved and hypoxia resolved following additional high-dose steroids.        PLAN:   Transition to steroid taper - Prednisone 50mg x3 days --> 40mg daily x3 days --> 30mg x3 days --> 20mg x3 days --> 10mg x3 days --> stop.    Continue DuoNebs Q4h, Budesnone neb Q12h, Mucomyst to Q6h while inpatient.     Discharge on Anoro inhaler and DuoNebs PRN.    Azithromycin x5 days.     Walking oximetry prior to discharge.     Wean O2 to goal SpO2 >88%.     Follow up with Pulmonologist at [facility].        Benito Hdez MD    [facility] / [facility]  Office: [phone]  Pager: [phone]

## 2023-06-07 NOTE — PROGRESS NOTES
Oxygen Documentation  I certify that this patient, Michele Gan has been under my care (or a nurse practitioner or physican's assistant working with me). This is the face-to-face encounter for oxygen medical necessity.      At the time of this encounter supplemental oxygen is reasonable and necessary and is expected to improve the patient's condition in a home setting.   He will need nocturnal oxygen as he desaturated to <88% for 4 min on overnight desaturation study     Patient has continued oxygen desaturation due to Chronic Bronchitis J41.0  COPD J44.9  Emphysema J43.9.    If portability is ordered, is the patient mobile within the home? yes

## 2023-06-07 NOTE — PLAN OF CARE
Goal Outcome Evaluation:  Discharge    Patient discharged to home with family  Care plan note: Patient alert/oriented X4, up independently in room. Lungs have exp wheezing, on RA. Vss, denied pain. Went over discharge instructions with patient, he was able to read back medications/instructions without questions.     Listed belongings gathered and given to patient (including from security/pharmacy). Yes  Care Plan and Patient education resolved: Yes  Prescriptions if needed, hard copies sent with patient  NA  Medication Bin checked and emptied on discharge Yes  SW/care coordinator/charge RN aware of discharge: Yes

## 2023-06-07 NOTE — PROGRESS NOTES
Overnight Oximetry was successfully completed. Pt was on Room Air during the study.   We will continue to follow. Elizabeth Recio, RT, 6/7/2023

## 2023-06-07 NOTE — PLAN OF CARE
Goal Outcome Evaluation:         Summary: COPD exacerbation   DATE & TIME: 6/6/2023-6/7/23 0530-8174  Cognitive Concerns/ Orientation: A&Ox4   BEHAVIOR & AGGRESSION TOOL COLOR: Green  CIWA SCORE: N/A   ABNL VS/O2:  VSS on RA  MOBILITY: Independent, calls appropriately; up in chair most of shift, ambulates to bathroom  PAIN MANAGMENT: Denies pain  DIET: Regular-poor appetite. Denies N/V.   BOWEL/BLADDER: Continent  ABNL LAB/BG:   DRAIN/DEVICES: 2x PIV S/L; intermittent IV abx  TELEMETRY RHYTHM: N/A  SKIN: Scattered bruises, petechia on LE + scratch on R arm.  TESTS/PROCEDURES: ECHO  done.  Discharge pending progress  OTHER IMPORTANT INFO: LS diminished with expiratory wheezing as that bases. Receiving nebs, RT following.CT chest done.Pulmonology following. Sleep study completed overnight. Prn Ambien given x1 at bedtime.

## 2023-06-08 ENCOUNTER — TELEPHONE (OUTPATIENT)
Dept: RESPIRATORY THERAPY | Facility: CLINIC | Age: 73
End: 2023-06-08
Payer: COMMERCIAL

## 2023-06-08 NOTE — TELEPHONE ENCOUNTER
Chronic Pulmonary Disease Specialist  Progress Note     Called patient to inquire about respiratorys/ status. Patient states that his breathing is stable. Carlos confirmed that he would make an appointment with his pulmonologist, Dr. Gorge Gamboa and discuss his maintenance medications. He did receive his pulmonary rehab referral.   Will continue to follow and provide support.     Gabby Thayer RRT, [facility]  Chronic Pulmonary Disease Specialist  Office: [phone]  Pager: [phone]

## 2023-06-09 ENCOUNTER — PATIENT OUTREACH (OUTPATIENT)
Dept: CARE COORDINATION | Facility: CLINIC | Age: 73
End: 2023-06-09
Payer: COMMERCIAL

## 2023-06-09 NOTE — PROGRESS NOTES
Clinic Care Coordination Contact  [facility]: Post-Discharge Note  SITUATION                                                      Admission:    Admission Date: 06/02/23   Reason for Admission: 1. COPD exacerbation (H)  J44.1       2. Acute respiratory failure, unspecified whether with hypoxia or hypercapnia (H)  J96.00  Discharge:   Discharge Date: 06/07/23  Discharge Diagnosis: Acute exacerbation of COPD  Acute on chronic hypoxic hypercarbic respiratory failure:  Resolved  Severe chronic obstructive lung disease  Pulmonary nodules  Petechiae, purpura: Improved or resolved  Elevated troponin: Likely demand ischemia    BACKGROUND                                                      Per hospital discharge summary and inpatient provider notes:    Michele Gan is a 72 year old male with a history of COPD, asthma, and GERD who presents with COPD exacerbation. EMS reports that the patient just flew into [place] from [place] and began experiencing wheezing and dyspnea. EMS reports that his dyspnea is worsened with exertion. EMS notes that the patient felt no relief with a nebulizer or BiPAP. EMS states that he doesn't regularly use CPAP or BiPAP at night, but does use oxygen. EMS reports that the patient is stiff. The patients blood sugar was 106 per EMS.      The patient reports of a cough he has had the last two days that wasn't noticeably productive and went away as of today. He denies pain or swelling in his legs. The patient has had no known contact with someone sick. He is unsure of what could have exacerbated his COPD. He has no known allergies.       ASSESSMENT           Discharge Assessment  How are you doing now that you are home?: Patient states he feels so good this morning that he took a walk this morning. Is out to coffee with friends. States he is surprised at how good he feels today.  How are your symptoms? (Red Flag symptoms escalate to triage hotline per guidelines): Improved  Do you feel your  condition is stable enough to be safe at home until your provider visit?: Yes  Does the patient have their discharge instructions? : Yes  Does the patient have questions regarding their discharge instructions? : No  Were you started on any new medications or were there changes to any of your previous medications? : Yes  Does the patient have all of their medications?: Yes  Do you have questions regarding any of your medications? : No  Do you have all of your needed medical supplies or equipment (DME)?  (i.e. oxygen tank, CPAP, cane, etc.): Yes  Discharge follow-up appointment scheduled within 14 calendar days? : No  Is patient agreeable to assistance with scheduling? : No (Has a call into his clinic already, awaiting a call back.)         Post-op (Clinicians Only)  Did the patient have surgery or a procedure: No      PLAN                                                      Outpatient Plan:  Follow up with primary care provider, Physician No Ref-Primary, within 7 days for hospital follow- up.  No follow up labs or test are needed.    Future Appointments   Date Time Provider Department Center   6/15/2023  9:00 AM 1, [facility]         For any urgent concerns, please contact our 24 hour nurse triage line: [phone] ([phone])         Georgina Hughes RN

## 2023-08-13 ENCOUNTER — HEALTH MAINTENANCE LETTER (OUTPATIENT)
Age: 73
End: 2023-08-13

## 2024-10-06 ENCOUNTER — HEALTH MAINTENANCE LETTER (OUTPATIENT)
Age: 74
End: 2024-10-06